# Patient Record
Sex: MALE | Race: WHITE | NOT HISPANIC OR LATINO | Employment: OTHER | URBAN - METROPOLITAN AREA
[De-identification: names, ages, dates, MRNs, and addresses within clinical notes are randomized per-mention and may not be internally consistent; named-entity substitution may affect disease eponyms.]

---

## 2019-01-03 DIAGNOSIS — Z01.812 PRE-PROCEDURAL LABORATORY EXAMINATION: ICD-10-CM

## 2019-01-03 DIAGNOSIS — Z01.810 PRE-OPERATIVE CARDIOVASCULAR EXAMINATION: ICD-10-CM

## 2019-01-03 LAB
ABO GROUP BLD: NORMAL
ANION GAP SERPL CALC-SCNC: 10 MMOL/L (ref 0–11.9)
APTT PPP: 29.4 SEC (ref 24.7–36)
BASOPHILS # BLD AUTO: 0.6 % (ref 0–1.8)
BASOPHILS # BLD: 0.04 K/UL (ref 0–0.12)
BLD GP AB SCN SERPL QL: NORMAL
BUN SERPL-MCNC: 28 MG/DL (ref 8–22)
CALCIUM SERPL-MCNC: 10.4 MG/DL (ref 8.5–10.5)
CHLORIDE SERPL-SCNC: 105 MMOL/L (ref 96–112)
CO2 SERPL-SCNC: 27 MMOL/L (ref 20–33)
CREAT SERPL-MCNC: 1.26 MG/DL (ref 0.5–1.4)
EKG IMPRESSION: NORMAL
EOSINOPHIL # BLD AUTO: 0.19 K/UL (ref 0–0.51)
EOSINOPHIL NFR BLD: 2.8 % (ref 0–6.9)
ERYTHROCYTE [DISTWIDTH] IN BLOOD BY AUTOMATED COUNT: 49.5 FL (ref 35.9–50)
EST. AVERAGE GLUCOSE BLD GHB EST-MCNC: 137 MG/DL
GLUCOSE SERPL-MCNC: 115 MG/DL (ref 65–99)
HBA1C MFR BLD: 6.4 % (ref 0–5.6)
HCT VFR BLD AUTO: 46.1 % (ref 42–52)
HGB BLD-MCNC: 15.5 G/DL (ref 14–18)
IMM GRANULOCYTES # BLD AUTO: 0.02 K/UL (ref 0–0.11)
IMM GRANULOCYTES NFR BLD AUTO: 0.3 % (ref 0–0.9)
INR PPP: 1.07 (ref 0.87–1.13)
LYMPHOCYTES # BLD AUTO: 1.95 K/UL (ref 1–4.8)
LYMPHOCYTES NFR BLD: 28.9 % (ref 22–41)
MCH RBC QN AUTO: 33 PG (ref 27–33)
MCHC RBC AUTO-ENTMCNC: 33.6 G/DL (ref 33.7–35.3)
MCV RBC AUTO: 98.1 FL (ref 81.4–97.8)
MONOCYTES # BLD AUTO: 0.63 K/UL (ref 0–0.85)
MONOCYTES NFR BLD AUTO: 9.3 % (ref 0–13.4)
NEUTROPHILS # BLD AUTO: 3.92 K/UL (ref 1.82–7.42)
NEUTROPHILS NFR BLD: 58.1 % (ref 44–72)
NRBC # BLD AUTO: 0 K/UL
NRBC BLD-RTO: 0 /100 WBC
PLATELET # BLD AUTO: 185 K/UL (ref 164–446)
PMV BLD AUTO: 11.1 FL (ref 9–12.9)
POTASSIUM SERPL-SCNC: 3.9 MMOL/L (ref 3.6–5.5)
PROTHROMBIN TIME: 14 SEC (ref 12–14.6)
RBC # BLD AUTO: 4.7 M/UL (ref 4.7–6.1)
RH BLD: NORMAL
SODIUM SERPL-SCNC: 142 MMOL/L (ref 135–145)
WBC # BLD AUTO: 6.8 K/UL (ref 4.8–10.8)

## 2019-01-03 PROCEDURE — 36415 COLL VENOUS BLD VENIPUNCTURE: CPT

## 2019-01-03 PROCEDURE — 80048 BASIC METABOLIC PNL TOTAL CA: CPT

## 2019-01-03 PROCEDURE — 93005 ELECTROCARDIOGRAM TRACING: CPT

## 2019-01-03 PROCEDURE — 93010 ELECTROCARDIOGRAM REPORT: CPT | Performed by: INTERNAL MEDICINE

## 2019-01-03 PROCEDURE — 85610 PROTHROMBIN TIME: CPT

## 2019-01-03 PROCEDURE — 86901 BLOOD TYPING SEROLOGIC RH(D): CPT

## 2019-01-03 PROCEDURE — 86900 BLOOD TYPING SEROLOGIC ABO: CPT

## 2019-01-03 PROCEDURE — 86850 RBC ANTIBODY SCREEN: CPT

## 2019-01-03 PROCEDURE — 83036 HEMOGLOBIN GLYCOSYLATED A1C: CPT

## 2019-01-03 PROCEDURE — 85025 COMPLETE CBC W/AUTO DIFF WBC: CPT

## 2019-01-03 PROCEDURE — 85730 THROMBOPLASTIN TIME PARTIAL: CPT

## 2019-01-03 RX ORDER — CHOLECALCIFEROL (VITAMIN D3) 50 MCG
2000 TABLET ORAL EVERY MORNING
Status: ON HOLD | COMMUNITY
End: 2019-02-20

## 2019-01-03 RX ORDER — CETIRIZINE HYDROCHLORIDE 10 MG/1
10 TABLET ORAL EVERY MORNING
COMMUNITY

## 2019-01-03 RX ORDER — OMEPRAZOLE 10 MG/1
10 CAPSULE, DELAYED RELEASE ORAL
COMMUNITY

## 2019-01-03 RX ORDER — ALLOPURINOL 100 MG/1
50 TABLET ORAL EVERY MORNING
COMMUNITY

## 2019-01-03 RX ORDER — HYDROCHLOROTHIAZIDE 25 MG/1
12.5 TABLET ORAL EVERY MORNING
Status: ON HOLD | COMMUNITY
End: 2019-02-21

## 2019-01-03 RX ORDER — NATEGLINIDE 120 MG/1
120 TABLET ORAL 2 TIMES DAILY
COMMUNITY

## 2019-01-03 RX ORDER — LOSARTAN POTASSIUM 50 MG/1
50 TABLET ORAL EVERY MORNING
COMMUNITY

## 2019-01-10 ENCOUNTER — APPOINTMENT (OUTPATIENT)
Dept: RADIOLOGY | Facility: MEDICAL CENTER | Age: 79
End: 2019-01-10
Attending: NEUROLOGICAL SURGERY
Payer: MEDICARE

## 2019-01-10 ENCOUNTER — HOSPITAL ENCOUNTER (OUTPATIENT)
Facility: MEDICAL CENTER | Age: 79
End: 2019-01-10
Attending: NEUROLOGICAL SURGERY | Admitting: NEUROLOGICAL SURGERY
Payer: MEDICARE

## 2019-01-10 VITALS
BODY MASS INDEX: 30.17 KG/M2 | RESPIRATION RATE: 18 BRPM | DIASTOLIC BLOOD PRESSURE: 67 MMHG | SYSTOLIC BLOOD PRESSURE: 123 MMHG | WEIGHT: 203.71 LBS | HEART RATE: 63 BPM | TEMPERATURE: 97.1 F | OXYGEN SATURATION: 94 % | HEIGHT: 69 IN

## 2019-01-10 DIAGNOSIS — Q27.39 ARTERIOVENOUS MALFORMATION, OTHER SITE: ICD-10-CM

## 2019-01-10 LAB
ABO GROUP BLD: NORMAL
GLUCOSE BLD-MCNC: 100 MG/DL (ref 65–99)
RH BLD: NORMAL

## 2019-01-10 PROCEDURE — 160002 HCHG RECOVERY MINUTES (STAT)

## 2019-01-10 PROCEDURE — 36227 PLACE CATH XTRNL CAROTID: CPT

## 2019-01-10 PROCEDURE — 36415 COLL VENOUS BLD VENIPUNCTURE: CPT

## 2019-01-10 PROCEDURE — 99153 MOD SED SAME PHYS/QHP EA: CPT

## 2019-01-10 PROCEDURE — 700105 HCHG RX REV CODE 258: Performed by: NEUROLOGICAL SURGERY

## 2019-01-10 PROCEDURE — 700111 HCHG RX REV CODE 636 W/ 250 OVERRIDE (IP)

## 2019-01-10 PROCEDURE — 82962 GLUCOSE BLOOD TEST: CPT

## 2019-01-10 RX ORDER — SODIUM CHLORIDE, SODIUM LACTATE, POTASSIUM CHLORIDE, CALCIUM CHLORIDE 600; 310; 30; 20 MG/100ML; MG/100ML; MG/100ML; MG/100ML
INJECTION, SOLUTION INTRAVENOUS ONCE
Status: COMPLETED | OUTPATIENT
Start: 2019-01-10 | End: 2019-01-10

## 2019-01-10 RX ORDER — FENOFIBRATE 145 MG/1
145 TABLET, COATED ORAL EVERY MORNING
COMMUNITY

## 2019-01-10 RX ORDER — SODIUM CHLORIDE 9 MG/ML
500 INJECTION, SOLUTION INTRAVENOUS
Status: DISPENSED | OUTPATIENT
Start: 2019-01-10 | End: 2019-01-10

## 2019-01-10 RX ORDER — SODIUM CHLORIDE 9 MG/ML
INJECTION, SOLUTION INTRAVENOUS CONTINUOUS
Status: DISCONTINUED | OUTPATIENT
Start: 2019-01-10 | End: 2019-01-10 | Stop reason: HOSPADM

## 2019-01-10 RX ORDER — FENOFIBRATE 145 MG/1
145 TABLET, COATED ORAL DAILY
Status: ON HOLD | COMMUNITY
End: 2019-01-10

## 2019-01-10 RX ORDER — MIDAZOLAM HYDROCHLORIDE 1 MG/ML
INJECTION INTRAMUSCULAR; INTRAVENOUS
Status: COMPLETED
Start: 2019-01-10 | End: 2019-01-10

## 2019-01-10 RX ORDER — MIDAZOLAM HYDROCHLORIDE 1 MG/ML
.5-2 INJECTION INTRAMUSCULAR; INTRAVENOUS PRN
Status: ACTIVE | OUTPATIENT
Start: 2019-01-10 | End: 2019-01-10

## 2019-01-10 RX ADMIN — SODIUM CHLORIDE: 9 INJECTION, SOLUTION INTRAVENOUS at 18:07

## 2019-01-10 RX ADMIN — MIDAZOLAM HYDROCHLORIDE 1 MG: 1 INJECTION INTRAMUSCULAR; INTRAVENOUS at 15:58

## 2019-01-10 RX ADMIN — MIDAZOLAM 1 MG: 1 INJECTION INTRAMUSCULAR; INTRAVENOUS at 15:40

## 2019-01-10 RX ADMIN — FENTANYL CITRATE 50 MCG: 50 INJECTION, SOLUTION INTRAMUSCULAR; INTRAVENOUS at 15:40

## 2019-01-10 RX ADMIN — SODIUM CHLORIDE, SODIUM LACTATE, POTASSIUM CHLORIDE, CALCIUM CHLORIDE: 600; 310; 30; 20 INJECTION, SOLUTION INTRAVENOUS at 14:16

## 2019-01-10 RX ADMIN — MIDAZOLAM HYDROCHLORIDE 1 MG: 1 INJECTION INTRAMUSCULAR; INTRAVENOUS at 15:40

## 2019-01-10 ASSESSMENT — PAIN SCALES - GENERAL
PAINLEVEL_OUTOF10: 0

## 2019-01-10 NOTE — PROGRESS NOTES
IR Nursing Note:    Patient consented by Dr. Vitale. Dr. Vitale performed Cerebral Angiogram.  Right femoral artery accessed. The pt tolerated the procedure well; ETCo2 baseline 30-35, with consistent waveform during the procedure.  Perclose, gauze and tegaderm applied to right groin, CDI and soft; pressure held x 3 minutes.  Pt alert and verbally appropriate post procedure, vital signs stable during procedure and transport, see flow sheet for vital signs.  Report given to MILES Corea.  RN transported pt to PACU 7B with Sao2 monitor 94%.      CorrectNet Perclose ProGlide 6F Closure System. REF [id number]. LOT [id number]

## 2019-01-11 NOTE — OR NURSING
Pt to PACU s/p cerebral angiogram in IR. Awake, oriented, ARNOLD. VSS throughout stay, NSR on monitor, Bps WNL, maintaining sats on.5L NC. Groin site WNL, dressing CDI, soft, non-tender, strong pedal pulse. Tolerating PO liquids with no c/o nausea. No c/o pain. Updated pt and family to POC, emotional support provided. Stable for transfer to phase two. Report to MILES [place].

## 2019-01-11 NOTE — OR NURSING
Assumed care of patient from PACU, see flow sheets for vital signs. Patient alert and oriented times four. Assessment completed. Surgical incision assessed, dressing clean, dry and intact. Pain is controlled and tolerable for patient. Patient updated of plan of care for discharge. Family/friend at bedside with patient.     2100: Discharge instructions reviewed and all questions answered. All needs met, patient dressed and safe to discharge home. Patient tolerating fluids with no nausea. Surgical incision CDI.    2115: Patient ready to go, last vital signs in flow sheet. PIV removed. Patient taken to car in wheelchair. Patient safe to discharge.

## 2019-01-11 NOTE — DISCHARGE INSTRUCTIONS
ACTIVITY: Rest and take it easy for the first 24 hours.  A responsible adult is recommended to remain with you during that time.  It is normal to feel sleepy.  We encourage you to not do anything that requires balance, judgment or coordination.    MILD FLU-LIKE SYMPTOMS ARE NORMAL. YOU MAY EXPERIENCE GENERALIZED MUSCLE ACHES, THROAT IRRITATION, HEADACHE AND/OR SOME NAUSEA.    FOR 24 HOURS DO NOT:  Drive, operate machinery or run household appliances.  Drink beer or alcoholic beverages.   Make important decisions or sign legal documents    SPECIAL INSTRUCTIONS:   Minimal activity at home for 2 days  No pushing, pulling or heavy lifting (10 lbs) for 2 weeks  Keep dressings on for 2 days, then remove  OK to shower after 2 days  Keep incisions clean and dry  No bath for 10 days  Regular diet    DIET: To avoid nausea, slowly advance diet as tolerated, avoiding spicy or greasy foods for the first day.  Add more substantial food to your diet according to your physician's instructions. INCREASE FLUIDS AND FIBER TO AVOID CONSTIPATION.    SURGICAL DRESSING/BATHING: See above instructions.    FOLLOW-UP APPOINTMENT:  A follow-up appointment should be arranged with your doctor in 1-2 weeks; call to schedule.    You should CALL YOUR PHYSICIAN if you develop:  Fever greater than 101 degrees F.  Pain not relieved by medication, or persistent nausea or vomiting.  Excessive bleeding (blood soaking through dressing) or unexpected drainage from the wound.  Extreme redness or swelling around the incision site, drainage of pus or foul smelling drainage.  Inability to urinate or empty your bladder within 8 hours.  Problems with breathing or chest pain.    You should call 911 if you develop problems with breathing or chest pain.  If you are unable to contact your doctor or surgical center, you should go to the nearest emergency room or urgent care center.  Physician's telephone #: Dr. Vitale [phone].    If any questions arise, call  your doctor.  If your doctor is not available, please feel free to call the Surgical Center at [phone].  The Center is open Monday through Friday from 7AM to 7PM.  You can also call the HEALTH HOTLINE open 24 hours/day, 7 days/week and speak to a nurse at [phone], or toll free at [phone].    A registered nurse may call you a few days after your surgery to see how you are doing after your procedure.    MEDICATIONS: Resume taking daily medication.  Take prescribed pain medication with food.  If no medication is prescribed, you may take non-aspirin pain medication if needed.  PAIN MEDICATION CAN BE VERY CONSTIPATING.  Take a stool softener or laxative such as senokot, pericolace, or milk of magnesia if needed.    Prescription given: none.  Last pain medication given:  None given in recovery.    If your physician has prescribed pain medication that includes Acetaminophen (Tylenol), do not take additional Acetaminophen (Tylenol) while taking the prescribed medication.    Depression / Suicide Risk    As you are discharged from this [facility] facility, it is important to learn how to keep safe from harming yourself.    Recognize the warning signs:  · Abrupt changes in personality, positive or negative- including increase in energy   · Giving away possessions  · Change in eating patterns- significant weight changes-  positive or negative  · Change in sleeping patterns- unable to sleep or sleeping all the time   · Unwillingness or inability to communicate  · Depression  · Unusual sadness, discouragement and loneliness  · Talk of wanting to die  · Neglect of personal appearance   · Rebelliousness- reckless behavior  · Withdrawal from people/activities they love  · Confusion- inability to concentrate     If you or a loved one observes any of these behaviors or has concerns about self-harm, here's what you can do:  · Talk about it- your feelings and reasons for harming yourself  · Remove any means that  you might use to hurt yourself (examples: pills, rope, extension cords, firearm)  · Get professional help from the community (Mental Health, Substance Abuse, psychological counseling)  · Do not be alone:Call your Safe Contact- someone whom you trust who will be there for you.  · Call your local CRISIS HOTLINE 551-1297 or 960-836-4204  · Call your local Children's Mobile Crisis Response Team [place] (508) 298-3648 or www.TVPage  · Call the toll free National Suicide Prevention Hotlines   · National Suicide Prevention Lifeline 980-539-AWVQ (7275)  · National Hope Line Network 800-SUICIDE (803-0727)

## 2019-01-23 NOTE — PROCEDURES
NEUROSURGERY OPERATIVE NOTE  DATE:  8:12 PM 1/10/2019    PATIENT NAME:  Howard Francois   1940 MRN [id number]      PROCEDURE:  1.  Bilateral common carotid artery angiographyi  2.  Bilaterlal internal carotida artery angiography  3.  Bilateral external carotid artery angiography  4   Bilateral vertebral artery angiography  5.  Ultrasound guided right common femoral arteriotomy    Surgeon:  James Vitale MD, PhD  Assistant:  None    Anesthesia:  Moderate Sedation  Medication:  Versed, Fentanyl   Contrast:  100 cc Visipaque 300    Diagnosis:  Right dural sinus fistula    Indication:  78 year old male with right sided pulsatile tinnitus; CTA is suggestive of dural sinus fistula involving the right transverse sinus.  Catheter angiography is planned for further evaluation.    Procedure:  The patient was identified in the holding area, and  consent was obtained.  The patinet was brought back to the angiography suite.  He was transferred to theangiography table in a supine manner and all pressure points well padded.  His bilateral groin regions were prepped with hair clipping and Chloroprep.  A 5 English sheath was paced in the right common femoral artery under ultrasound guidance using standard Seldinger technique.    A 4 English Angled glide catheter was positioned in the right common carotid artery using flouroscopic guidance.  Oblique and lateral views of the carotid arteries were obtained.  The catheter was positioned within the right internal carotid artery; standard AP, lateral, and magnified oblique views of the intracranial circulation were obtained.  The catheter was positioned in the right external carotid artery; AP and lateral views centered over the cranium were obtained.     The catheter was withdrawn into the aortic arch and positioned in the left common carotid artery using flouroscopic guidance.  Oblique and lateral views of the carotid arteries were obtained.  The catheter was positioned within the  left internal carotid artery; standard AP, lateral, and magnified oblique views of the intracranial circulation were obtained.  The catheter was positioned in the left external carotid artery; AP and lateral views centered over the cranium were obtained.     The catheter was withdrawn into the aortic arch and positioned in the left vertebral artery; standard Chucky and lateral views of the posterior circulation were obtained.   The images were reviewed and the catheter removed.  Hemostasis was obtained with a 6 [place] Perclose device.    Findings:  The right common carotid artery demonstrates anterograde opacification of the cervical carotid arteries.  Robust filling of a right sided arterial dural sinus fistula draining into the right transverse sinus is supplied by an enlarged occipital artery.  Venous drainage is anterograde.    The right internal carotid artery injection demonstrates anterograde opacification of the anterior and middle cerebral arteries.  No opacification of the posterior communicating artery occurs; no opacification of the anterior communicating artery occurs.  Early opacification of the right transverse sinus occurs via an enlarged dorsal tentorial artery.  Limited opacification of the right transverse sinus occurs due to competing flow from the fistula.    The right external carotid artery injection demonstrates anterograde opacification of the external carotid artery branches occurs.  Brisk opacification of the right transverse sinus occurs via an enlarged occipital artery.  Venous drainage refluxes into the petrosal sinus to the cavernous sinus.      The right vertebral artery injection demonstrates anterograde opacification of the posterior circulation.  Early opacification of the right transverse sinus occurs via an enlarged muscular artery originating off the distal vertebral artery.  The capillary and venous phases are otherwise unremarkable.    The left common carotid artery injection  demonstrates anterograde opacification of the common carotid artery branches.      The left internal carotid artery injection demonstrates anterograde opacification of the anterior and middle cerebral arteries.  No opacification of the posterior communicating artery occurs. The anterior communicating artery is well visualized on the transorbital views.  The capillary and venous phases were unremarkable except for limited opacification of the right transverse sinus.    The left external carotid artery injection demonstrates anterograde opacification of the external carotid artery branches.  Limited opacification of the right transverse sinus occurs via transosseous anastomosis of external carotid artery branches crossing from the left scalp branches.      The left vertebral artery injection demonstrates anterograde opacification of the posterior circulation.  The capillary and venous phases are unremarkable.  The vertebral arteries are co-dominant.    Summary:    1.  Right dural arterial venous fistula supplied primarilly by right occipital artery, also by right dorsal tentorial branch from right ICA, muscular branch from right vertebral artery, and left superficial artery crossing over midline.  Venous drainage is to right transverse sinus into petrosal sinus/cavernous sinus.  2.  Patent anterior communicating artery, no opacification of bilateral posterior communicating arteries.  3.  Codominant vertebral arteries    COMPLICATIONS:  None apparent at end of procedure.

## 2019-02-08 DIAGNOSIS — Z01.812 PRE-OPERATIVE LABORATORY EXAMINATION: ICD-10-CM

## 2019-02-08 LAB
ANION GAP SERPL CALC-SCNC: 9 MMOL/L (ref 0–11.9)
BASOPHILS # BLD AUTO: 0.8 % (ref 0–1.8)
BASOPHILS # BLD: 0.04 K/UL (ref 0–0.12)
BUN SERPL-MCNC: 23 MG/DL (ref 8–22)
CALCIUM SERPL-MCNC: 9.5 MG/DL (ref 8.5–10.5)
CHLORIDE SERPL-SCNC: 105 MMOL/L (ref 96–112)
CO2 SERPL-SCNC: 26 MMOL/L (ref 20–33)
CREAT SERPL-MCNC: 1.27 MG/DL (ref 0.5–1.4)
EOSINOPHIL # BLD AUTO: 0.18 K/UL (ref 0–0.51)
EOSINOPHIL NFR BLD: 3.6 % (ref 0–6.9)
ERYTHROCYTE [DISTWIDTH] IN BLOOD BY AUTOMATED COUNT: 49.1 FL (ref 35.9–50)
GLUCOSE SERPL-MCNC: 102 MG/DL (ref 65–99)
HCT VFR BLD AUTO: 45 % (ref 42–52)
HGB BLD-MCNC: 15.3 G/DL (ref 14–18)
IMM GRANULOCYTES # BLD AUTO: 0.02 K/UL (ref 0–0.11)
IMM GRANULOCYTES NFR BLD AUTO: 0.4 % (ref 0–0.9)
LYMPHOCYTES # BLD AUTO: 1.62 K/UL (ref 1–4.8)
LYMPHOCYTES NFR BLD: 32.7 % (ref 22–41)
MCH RBC QN AUTO: 33.2 PG (ref 27–33)
MCHC RBC AUTO-ENTMCNC: 34 G/DL (ref 33.7–35.3)
MCV RBC AUTO: 97.6 FL (ref 81.4–97.8)
MONOCYTES # BLD AUTO: 0.47 K/UL (ref 0–0.85)
MONOCYTES NFR BLD AUTO: 9.5 % (ref 0–13.4)
NEUTROPHILS # BLD AUTO: 2.62 K/UL (ref 1.82–7.42)
NEUTROPHILS NFR BLD: 53 % (ref 44–72)
NRBC # BLD AUTO: 0 K/UL
NRBC BLD-RTO: 0 /100 WBC
PLATELET # BLD AUTO: 170 K/UL (ref 164–446)
PMV BLD AUTO: 11 FL (ref 9–12.9)
POTASSIUM SERPL-SCNC: 4.1 MMOL/L (ref 3.6–5.5)
RBC # BLD AUTO: 4.61 M/UL (ref 4.7–6.1)
SODIUM SERPL-SCNC: 140 MMOL/L (ref 135–145)
WBC # BLD AUTO: 5 K/UL (ref 4.8–10.8)

## 2019-02-08 PROCEDURE — 36415 COLL VENOUS BLD VENIPUNCTURE: CPT

## 2019-02-08 PROCEDURE — 80048 BASIC METABOLIC PNL TOTAL CA: CPT

## 2019-02-08 PROCEDURE — 85025 COMPLETE CBC W/AUTO DIFF WBC: CPT

## 2019-02-20 ENCOUNTER — HOSPITAL ENCOUNTER (INPATIENT)
Facility: MEDICAL CENTER | Age: 79
LOS: 1 days | DRG: 026 | End: 2019-02-21
Attending: NEUROLOGICAL SURGERY | Admitting: NEUROLOGICAL SURGERY
Payer: MEDICARE

## 2019-02-20 ENCOUNTER — APPOINTMENT (OUTPATIENT)
Dept: RADIOLOGY | Facility: MEDICAL CENTER | Age: 79
DRG: 026 | End: 2019-02-20
Attending: NEUROLOGICAL SURGERY
Payer: MEDICARE

## 2019-02-20 DIAGNOSIS — Q27.39 ARTERIOVENOUS MALFORMATION, OTHER SITE: ICD-10-CM

## 2019-02-20 DIAGNOSIS — Q28.2 AVM (ARTERIOVENOUS MALFORMATION) BRAIN: ICD-10-CM

## 2019-02-20 DIAGNOSIS — G89.18 POST-OPERATIVE PAIN: ICD-10-CM

## 2019-02-20 PROBLEM — H91.93 BILATERAL HEARING LOSS: Status: ACTIVE | Noted: 2019-02-20

## 2019-02-20 PROBLEM — I48.91 NEW ONSET ATRIAL FIBRILLATION (HCC): Status: ACTIVE | Noted: 2019-02-20

## 2019-02-20 PROBLEM — M10.9 GOUT: Status: ACTIVE | Noted: 2019-02-20

## 2019-02-20 PROBLEM — E11.9 DIABETES MELLITUS TYPE 2 IN NONOBESE (HCC): Status: ACTIVE | Noted: 2019-02-20

## 2019-02-20 LAB — GLUCOSE BLD-MCNC: 105 MG/DL (ref 65–99)

## 2019-02-20 PROCEDURE — B3131ZZ FLUOROSCOPY OF RIGHT COMMON CAROTID ARTERY USING LOW OSMOLAR CONTRAST: ICD-10-PCS | Performed by: NEUROLOGICAL SURGERY

## 2019-02-20 PROCEDURE — 700101 HCHG RX REV CODE 250

## 2019-02-20 PROCEDURE — 99291 CRITICAL CARE FIRST HOUR: CPT | Performed by: INTERNAL MEDICINE

## 2019-02-20 PROCEDURE — 82962 GLUCOSE BLOOD TEST: CPT

## 2019-02-20 PROCEDURE — 700111 HCHG RX REV CODE 636 W/ 250 OVERRIDE (IP)

## 2019-02-20 PROCEDURE — 770022 HCHG ROOM/CARE - ICU (200)

## 2019-02-20 PROCEDURE — A9270 NON-COVERED ITEM OR SERVICE: HCPCS | Performed by: NEUROLOGICAL SURGERY

## 2019-02-20 PROCEDURE — B3161ZZ FLUOROSCOPY OF RIGHT INTERNAL CAROTID ARTERY USING LOW OSMOLAR CONTRAST: ICD-10-PCS | Performed by: NEUROLOGICAL SURGERY

## 2019-02-20 PROCEDURE — 03LG3DZ OCCLUSION OF INTRACRANIAL ARTERY WITH INTRALUMINAL DEVICE, PERCUTANEOUS APPROACH: ICD-10-PCS | Performed by: NEUROLOGICAL SURGERY

## 2019-02-20 PROCEDURE — 160002 HCHG RECOVERY MINUTES (STAT)

## 2019-02-20 PROCEDURE — 700111 HCHG RX REV CODE 636 W/ 250 OVERRIDE (IP): Performed by: NEUROLOGICAL SURGERY

## 2019-02-20 PROCEDURE — 700102 HCHG RX REV CODE 250 W/ 637 OVERRIDE(OP): Performed by: NEUROLOGICAL SURGERY

## 2019-02-20 PROCEDURE — B3191ZZ FLUOROSCOPY OF RIGHT EXTERNAL CAROTID ARTERY USING LOW OSMOLAR CONTRAST: ICD-10-PCS | Performed by: NEUROLOGICAL SURGERY

## 2019-02-20 PROCEDURE — 700111 HCHG RX REV CODE 636 W/ 250 OVERRIDE (IP): Performed by: ANESTHESIOLOGY

## 2019-02-20 PROCEDURE — 700101 HCHG RX REV CODE 250: Performed by: NEUROLOGICAL SURGERY

## 2019-02-20 PROCEDURE — B31D1ZZ FLUOROSCOPY OF RIGHT VERTEBRAL ARTERY USING LOW OSMOLAR CONTRAST: ICD-10-PCS | Performed by: NEUROLOGICAL SURGERY

## 2019-02-20 PROCEDURE — 93005 ELECTROCARDIOGRAM TRACING: CPT | Performed by: HOSPITALIST

## 2019-02-20 PROCEDURE — 99222 1ST HOSP IP/OBS MODERATE 55: CPT | Performed by: HOSPITALIST

## 2019-02-20 PROCEDURE — 4410237 IR-EMBOLIZE-NEURO-INTRACRANIAL

## 2019-02-20 PROCEDURE — 61624 TCAT PERM OCCLS/EMBOLJ CNS: CPT

## 2019-02-20 PROCEDURE — 93010 ELECTROCARDIOGRAM REPORT: CPT | Performed by: INTERNAL MEDICINE

## 2019-02-20 RX ORDER — POLYETHYLENE GLYCOL 3350 17 G/17G
1 POWDER, FOR SOLUTION ORAL 2 TIMES DAILY PRN
Status: DISCONTINUED | OUTPATIENT
Start: 2019-02-20 | End: 2019-02-21 | Stop reason: HOSPADM

## 2019-02-20 RX ORDER — IPRATROPIUM BROMIDE AND ALBUTEROL SULFATE 2.5; .5 MG/3ML; MG/3ML
3 SOLUTION RESPIRATORY (INHALATION)
Status: DISCONTINUED | OUTPATIENT
Start: 2019-02-20 | End: 2019-02-20 | Stop reason: HOSPADM

## 2019-02-20 RX ORDER — DEXTROSE MONOHYDRATE, SODIUM CHLORIDE, AND POTASSIUM CHLORIDE 50; 1.49; 9 G/1000ML; G/1000ML; G/1000ML
INJECTION, SOLUTION INTRAVENOUS CONTINUOUS
Status: DISCONTINUED | OUTPATIENT
Start: 2019-02-20 | End: 2019-02-21

## 2019-02-20 RX ORDER — ONDANSETRON 4 MG/1
4 TABLET, ORALLY DISINTEGRATING ORAL EVERY 4 HOURS PRN
Status: DISCONTINUED | OUTPATIENT
Start: 2019-02-20 | End: 2019-02-20

## 2019-02-20 RX ORDER — BISACODYL 10 MG
10 SUPPOSITORY, RECTAL RECTAL
Status: DISCONTINUED | OUTPATIENT
Start: 2019-02-20 | End: 2019-02-21 | Stop reason: HOSPADM

## 2019-02-20 RX ORDER — DEXAMETHASONE SODIUM PHOSPHATE 4 MG/ML
4 INJECTION, SOLUTION INTRA-ARTICULAR; INTRALESIONAL; INTRAMUSCULAR; INTRAVENOUS; SOFT TISSUE
Status: COMPLETED | OUTPATIENT
Start: 2019-02-20 | End: 2019-02-20

## 2019-02-20 RX ORDER — HALOPERIDOL 5 MG/ML
1 INJECTION INTRAMUSCULAR
Status: DISCONTINUED | OUTPATIENT
Start: 2019-02-20 | End: 2019-02-20 | Stop reason: HOSPADM

## 2019-02-20 RX ORDER — MEPERIDINE HYDROCHLORIDE 25 MG/ML
12.5 INJECTION INTRAMUSCULAR; INTRAVENOUS; SUBCUTANEOUS
Status: DISCONTINUED | OUTPATIENT
Start: 2019-02-20 | End: 2019-02-20 | Stop reason: HOSPADM

## 2019-02-20 RX ORDER — BISACODYL 10 MG
10 SUPPOSITORY, RECTAL RECTAL
Status: DISCONTINUED | OUTPATIENT
Start: 2019-02-20 | End: 2019-02-20

## 2019-02-20 RX ORDER — SODIUM CHLORIDE, SODIUM LACTATE, POTASSIUM CHLORIDE, CALCIUM CHLORIDE 600; 310; 30; 20 MG/100ML; MG/100ML; MG/100ML; MG/100ML
INJECTION, SOLUTION INTRAVENOUS CONTINUOUS
Status: DISCONTINUED | OUTPATIENT
Start: 2019-02-20 | End: 2019-02-20 | Stop reason: HOSPADM

## 2019-02-20 RX ORDER — ONDANSETRON 2 MG/ML
4 INJECTION INTRAMUSCULAR; INTRAVENOUS
Status: COMPLETED | OUTPATIENT
Start: 2019-02-20 | End: 2019-02-20

## 2019-02-20 RX ORDER — FENOFIBRATE 134 MG/1
134 CAPSULE ORAL EVERY MORNING
Status: DISCONTINUED | OUTPATIENT
Start: 2019-02-21 | End: 2019-02-21 | Stop reason: HOSPADM

## 2019-02-20 RX ORDER — OXYCODONE HYDROCHLORIDE 5 MG/1
5 TABLET ORAL
Status: DISCONTINUED | OUTPATIENT
Start: 2019-02-20 | End: 2019-02-21 | Stop reason: HOSPADM

## 2019-02-20 RX ORDER — AMOXICILLIN 250 MG
1 CAPSULE ORAL
Status: DISCONTINUED | OUTPATIENT
Start: 2019-02-20 | End: 2019-02-21 | Stop reason: HOSPADM

## 2019-02-20 RX ORDER — CETIRIZINE HYDROCHLORIDE 10 MG/1
10 TABLET ORAL EVERY MORNING
Status: DISCONTINUED | OUTPATIENT
Start: 2019-02-21 | End: 2019-02-21 | Stop reason: HOSPADM

## 2019-02-20 RX ORDER — HEPARIN SODIUM,PORCINE 1000/ML
VIAL (ML) INJECTION
Status: COMPLETED
Start: 2019-02-20 | End: 2019-02-20

## 2019-02-20 RX ORDER — NATEGLINIDE 120 MG/1
120 TABLET ORAL 2 TIMES DAILY
Status: DISCONTINUED | OUTPATIENT
Start: 2019-02-21 | End: 2019-02-21 | Stop reason: HOSPADM

## 2019-02-20 RX ORDER — ACETAMINOPHEN 500 MG
1000 TABLET ORAL ONCE
Status: DISCONTINUED | OUTPATIENT
Start: 2019-02-20 | End: 2019-02-20

## 2019-02-20 RX ORDER — ACETAMINOPHEN 325 MG/1
650 TABLET ORAL EVERY 6 HOURS PRN
Status: DISCONTINUED | OUTPATIENT
Start: 2019-02-20 | End: 2019-02-21 | Stop reason: HOSPADM

## 2019-02-20 RX ORDER — LOSARTAN POTASSIUM 50 MG/1
50 TABLET ORAL EVERY MORNING
Status: DISCONTINUED | OUTPATIENT
Start: 2019-02-21 | End: 2019-02-20

## 2019-02-20 RX ORDER — DIPHENHYDRAMINE HYDROCHLORIDE 50 MG/ML
12.5 INJECTION INTRAMUSCULAR; INTRAVENOUS
Status: COMPLETED | OUTPATIENT
Start: 2019-02-20 | End: 2019-02-20

## 2019-02-20 RX ORDER — SODIUM CHLORIDE, SODIUM LACTATE, POTASSIUM CHLORIDE, CALCIUM CHLORIDE 600; 310; 30; 20 MG/100ML; MG/100ML; MG/100ML; MG/100ML
INJECTION, SOLUTION INTRAVENOUS ONCE
Status: COMPLETED | OUTPATIENT
Start: 2019-02-20 | End: 2019-02-20

## 2019-02-20 RX ORDER — MORPHINE SULFATE 4 MG/ML
2 INJECTION, SOLUTION INTRAMUSCULAR; INTRAVENOUS
Status: DISCONTINUED | OUTPATIENT
Start: 2019-02-20 | End: 2019-02-21 | Stop reason: HOSPADM

## 2019-02-20 RX ORDER — OMEPRAZOLE 20 MG/1
20 CAPSULE, DELAYED RELEASE ORAL
Status: DISCONTINUED | OUTPATIENT
Start: 2019-02-20 | End: 2019-02-21 | Stop reason: HOSPADM

## 2019-02-20 RX ORDER — IBUPROFEN 200 MG
200 TABLET ORAL EVERY 6 HOURS PRN
Status: ON HOLD | COMMUNITY
End: 2019-02-21

## 2019-02-20 RX ORDER — SCOLOPAMINE TRANSDERMAL SYSTEM 1 MG/1
1 PATCH, EXTENDED RELEASE TRANSDERMAL
Status: DISCONTINUED | OUTPATIENT
Start: 2019-02-20 | End: 2019-02-21 | Stop reason: HOSPADM

## 2019-02-20 RX ORDER — OXYCODONE HYDROCHLORIDE 5 MG/1
2.5 TABLET ORAL
Status: DISCONTINUED | OUTPATIENT
Start: 2019-02-20 | End: 2019-02-21 | Stop reason: HOSPADM

## 2019-02-20 RX ORDER — LABETALOL HYDROCHLORIDE 5 MG/ML
10 INJECTION, SOLUTION INTRAVENOUS
Status: DISCONTINUED | OUTPATIENT
Start: 2019-02-20 | End: 2019-02-21 | Stop reason: HOSPADM

## 2019-02-20 RX ORDER — AMOXICILLIN 250 MG
2 CAPSULE ORAL 2 TIMES DAILY
Status: DISCONTINUED | OUTPATIENT
Start: 2019-02-20 | End: 2019-02-20

## 2019-02-20 RX ORDER — ALLOPURINOL 100 MG/1
50 TABLET ORAL EVERY MORNING
Status: DISCONTINUED | OUTPATIENT
Start: 2019-02-21 | End: 2019-02-21 | Stop reason: HOSPADM

## 2019-02-20 RX ORDER — HYDROCHLOROTHIAZIDE 12.5 MG/1
12.5 TABLET ORAL EVERY MORNING
Status: DISCONTINUED | OUTPATIENT
Start: 2019-02-21 | End: 2019-02-21

## 2019-02-20 RX ORDER — ONDANSETRON 2 MG/ML
4 INJECTION INTRAMUSCULAR; INTRAVENOUS EVERY 4 HOURS PRN
Status: DISCONTINUED | OUTPATIENT
Start: 2019-02-20 | End: 2019-02-21 | Stop reason: HOSPADM

## 2019-02-20 RX ORDER — DEXTROSE MONOHYDRATE 25 G/50ML
25 INJECTION, SOLUTION INTRAVENOUS
Status: DISCONTINUED | OUTPATIENT
Start: 2019-02-20 | End: 2019-02-21 | Stop reason: HOSPADM

## 2019-02-20 RX ORDER — ENEMA 19; 7 G/133ML; G/133ML
1 ENEMA RECTAL
Status: DISCONTINUED | OUTPATIENT
Start: 2019-02-20 | End: 2019-02-21 | Stop reason: HOSPADM

## 2019-02-20 RX ORDER — DIPHENHYDRAMINE HYDROCHLORIDE 50 MG/ML
25 INJECTION INTRAMUSCULAR; INTRAVENOUS EVERY 6 HOURS PRN
Status: DISCONTINUED | OUTPATIENT
Start: 2019-02-20 | End: 2019-02-21 | Stop reason: HOSPADM

## 2019-02-20 RX ORDER — HYDRALAZINE HYDROCHLORIDE 20 MG/ML
10 INJECTION INTRAMUSCULAR; INTRAVENOUS
Status: DISCONTINUED | OUTPATIENT
Start: 2019-02-20 | End: 2019-02-21 | Stop reason: HOSPADM

## 2019-02-20 RX ORDER — ONDANSETRON 2 MG/ML
4 INJECTION INTRAMUSCULAR; INTRAVENOUS EVERY 4 HOURS PRN
Status: DISCONTINUED | OUTPATIENT
Start: 2019-02-20 | End: 2019-02-20

## 2019-02-20 RX ORDER — DOCUSATE SODIUM 100 MG/1
100 CAPSULE, LIQUID FILLED ORAL 2 TIMES DAILY
Status: DISCONTINUED | OUTPATIENT
Start: 2019-02-20 | End: 2019-02-21 | Stop reason: HOSPADM

## 2019-02-20 RX ORDER — AMOXICILLIN 250 MG
1 CAPSULE ORAL NIGHTLY
Status: DISCONTINUED | OUTPATIENT
Start: 2019-02-20 | End: 2019-02-21 | Stop reason: HOSPADM

## 2019-02-20 RX ORDER — CLONIDINE HYDROCHLORIDE 0.1 MG/1
0.1 TABLET ORAL EVERY 4 HOURS PRN
Status: DISCONTINUED | OUTPATIENT
Start: 2019-02-20 | End: 2019-02-21 | Stop reason: HOSPADM

## 2019-02-20 RX ORDER — ACETAMINOPHEN 325 MG/1
650 TABLET ORAL EVERY 6 HOURS
Status: DISCONTINUED | OUTPATIENT
Start: 2019-02-21 | End: 2019-02-21 | Stop reason: HOSPADM

## 2019-02-20 RX ORDER — POLYETHYLENE GLYCOL 3350 17 G/17G
1 POWDER, FOR SOLUTION ORAL
Status: DISCONTINUED | OUTPATIENT
Start: 2019-02-20 | End: 2019-02-20

## 2019-02-20 RX ADMIN — FENTANYL CITRATE 50 MCG: 50 INJECTION, SOLUTION INTRAMUSCULAR; INTRAVENOUS at 21:18

## 2019-02-20 RX ADMIN — SODIUM CHLORIDE, SODIUM LACTATE, POTASSIUM CHLORIDE, CALCIUM CHLORIDE: 600; 310; 30; 20 INJECTION, SOLUTION INTRAVENOUS at 13:40

## 2019-02-20 RX ADMIN — LIDOCAINE HYDROCHLORIDE 0.5 ML: 10 INJECTION, SOLUTION EPIDURAL; INFILTRATION; INTRACAUDAL; PERINEURAL at 13:40

## 2019-02-20 RX ADMIN — DIPHENHYDRAMINE HYDROCHLORIDE 12.5 MG: 50 INJECTION INTRAMUSCULAR; INTRAVENOUS at 21:35

## 2019-02-20 RX ADMIN — POTASSIUM CHLORIDE, DEXTROSE MONOHYDRATE AND SODIUM CHLORIDE: 150; 5; 900 INJECTION, SOLUTION INTRAVENOUS at 23:34

## 2019-02-20 RX ADMIN — ONDANSETRON 4 MG: 2 INJECTION INTRAMUSCULAR; INTRAVENOUS at 21:25

## 2019-02-20 RX ADMIN — MORPHINE SULFATE 2 MG: 4 INJECTION INTRAVENOUS at 23:11

## 2019-02-20 RX ADMIN — DIPHENHYDRAMINE HYDROCHLORIDE 12.5 MG: 50 INJECTION INTRAMUSCULAR; INTRAVENOUS at 21:58

## 2019-02-20 RX ADMIN — HEPARIN SODIUM 3000 UNITS: 1000 INJECTION, SOLUTION INTRAVENOUS; SUBCUTANEOUS at 18:19

## 2019-02-20 RX ADMIN — ACETAMINOPHEN 650 MG: 325 TABLET, FILM COATED ORAL at 23:40

## 2019-02-20 RX ADMIN — DEXAMETHASONE SODIUM PHOSPHATE 4 MG: 4 INJECTION, SOLUTION INTRA-ARTICULAR; INTRALESIONAL; INTRAMUSCULAR; INTRAVENOUS; SOFT TISSUE at 23:24

## 2019-02-20 RX ADMIN — FENTANYL CITRATE: 50 INJECTION, SOLUTION INTRAMUSCULAR; INTRAVENOUS at 16:30

## 2019-02-20 ASSESSMENT — ENCOUNTER SYMPTOMS
CLAUDICATION: 0
SPEECH CHANGE: 0
NAUSEA: 0
WEAKNESS: 0
DEPRESSION: 0
CHILLS: 0
VOMITING: 0
FOCAL WEAKNESS: 0
LOSS OF CONSCIOUSNESS: 0
DIZZINESS: 0
COUGH: 0
CONSTIPATION: 0
HEADACHES: 1
FEVER: 0
HEARTBURN: 0
SORE THROAT: 0
WHEEZING: 0
HEADACHES: 0
EYE PAIN: 0
HEMOPTYSIS: 0
SPUTUM PRODUCTION: 0
SHORTNESS OF BREATH: 0
ABDOMINAL PAIN: 0
NECK PAIN: 0
BLURRED VISION: 0
EYE DISCHARGE: 0
PND: 0
PALPITATIONS: 0
BRUISES/BLEEDS EASILY: 0
TREMORS: 0
DIARRHEA: 0
MYALGIAS: 0
SENSORY CHANGE: 0
DOUBLE VISION: 0
BACK PAIN: 0
DIAPHORESIS: 0

## 2019-02-20 ASSESSMENT — LIFESTYLE VARIABLES
SUBSTANCE_ABUSE: 0
EVER_SMOKED: YES

## 2019-02-20 ASSESSMENT — COPD QUESTIONNAIRES
DO YOU EVER COUGH UP ANY MUCUS OR PHLEGM?: NO/ONLY WITH OCCASIONAL COLDS OR INFECTIONS
COPD SCREENING SCORE: 4
HAVE YOU SMOKED AT LEAST 100 CIGARETTES IN YOUR ENTIRE LIFE: YES
DURING THE PAST 4 WEEKS HOW MUCH DID YOU FEEL SHORT OF BREATH: NONE/LITTLE OF THE TIME

## 2019-02-20 NOTE — PROGRESS NOTES
Med rec updated and complete  Allergies reviewed  Interviewed pt with wife at bedside with permission from pt.  Pt reports no antibiotics in the last 30 days.

## 2019-02-21 ENCOUNTER — PATIENT OUTREACH (OUTPATIENT)
Dept: HEALTH INFORMATION MANAGEMENT | Facility: OTHER | Age: 79
End: 2019-02-21

## 2019-02-21 ENCOUNTER — APPOINTMENT (OUTPATIENT)
Dept: CARDIOLOGY | Facility: MEDICAL CENTER | Age: 79
DRG: 026 | End: 2019-02-21
Attending: HOSPITALIST
Payer: MEDICARE

## 2019-02-21 VITALS
TEMPERATURE: 97.3 F | RESPIRATION RATE: 18 BRPM | BODY MASS INDEX: 30.37 KG/M2 | HEIGHT: 69 IN | SYSTOLIC BLOOD PRESSURE: 138 MMHG | OXYGEN SATURATION: 94 % | WEIGHT: 205.03 LBS | DIASTOLIC BLOOD PRESSURE: 81 MMHG | HEART RATE: 66 BPM

## 2019-02-21 PROBLEM — I10 ESSENTIAL HYPERTENSION: Status: ACTIVE | Noted: 2019-02-21

## 2019-02-21 LAB
ALBUMIN SERPL BCP-MCNC: 3.7 G/DL (ref 3.2–4.9)
ALBUMIN/GLOB SERPL: 1.3 G/DL
ALP SERPL-CCNC: 42 U/L (ref 30–99)
ALT SERPL-CCNC: 19 U/L (ref 2–50)
ANION GAP SERPL CALC-SCNC: 12 MMOL/L (ref 0–11.9)
AST SERPL-CCNC: 31 U/L (ref 12–45)
BASOPHILS # BLD AUTO: 0.1 % (ref 0–1.8)
BASOPHILS # BLD: 0.01 K/UL (ref 0–0.12)
BILIRUB SERPL-MCNC: 0.7 MG/DL (ref 0.1–1.5)
BUN SERPL-MCNC: 21 MG/DL (ref 8–22)
CALCIUM SERPL-MCNC: 8.6 MG/DL (ref 8.5–10.5)
CHLORIDE SERPL-SCNC: 107 MMOL/L (ref 96–112)
CO2 SERPL-SCNC: 19 MMOL/L (ref 20–33)
CREAT SERPL-MCNC: 1.04 MG/DL (ref 0.5–1.4)
EKG IMPRESSION: NORMAL
EOSINOPHIL # BLD AUTO: 0 K/UL (ref 0–0.51)
EOSINOPHIL NFR BLD: 0 % (ref 0–6.9)
ERYTHROCYTE [DISTWIDTH] IN BLOOD BY AUTOMATED COUNT: 49.6 FL (ref 35.9–50)
GLOBULIN SER CALC-MCNC: 2.9 G/DL (ref 1.9–3.5)
GLUCOSE BLD-MCNC: 186 MG/DL (ref 65–99)
GLUCOSE BLD-MCNC: 234 MG/DL (ref 65–99)
GLUCOSE SERPL-MCNC: 232 MG/DL (ref 65–99)
HCT VFR BLD AUTO: 44.2 % (ref 42–52)
HGB BLD-MCNC: 14.7 G/DL (ref 14–18)
IMM GRANULOCYTES # BLD AUTO: 0.02 K/UL (ref 0–0.11)
IMM GRANULOCYTES NFR BLD AUTO: 0.3 % (ref 0–0.9)
LV EJECT FRACT  99904: 60
LV EJECT FRACT MOD 2C 99903: 58.89
LV EJECT FRACT MOD 4C 99902: 69.42
LV EJECT FRACT MOD BP 99901: 64.7
LYMPHOCYTES # BLD AUTO: 0.55 K/UL (ref 1–4.8)
LYMPHOCYTES NFR BLD: 7.5 % (ref 22–41)
MAGNESIUM SERPL-MCNC: 1.7 MG/DL (ref 1.5–2.5)
MCH RBC QN AUTO: 32.7 PG (ref 27–33)
MCHC RBC AUTO-ENTMCNC: 33.3 G/DL (ref 33.7–35.3)
MCV RBC AUTO: 98.4 FL (ref 81.4–97.8)
MONOCYTES # BLD AUTO: 0.05 K/UL (ref 0–0.85)
MONOCYTES NFR BLD AUTO: 0.7 % (ref 0–13.4)
NEUTROPHILS # BLD AUTO: 6.67 K/UL (ref 1.82–7.42)
NEUTROPHILS NFR BLD: 91.4 % (ref 44–72)
NRBC # BLD AUTO: 0 K/UL
NRBC BLD-RTO: 0 /100 WBC
PLATELET # BLD AUTO: 157 K/UL (ref 164–446)
PMV BLD AUTO: 10.8 FL (ref 9–12.9)
POTASSIUM SERPL-SCNC: 3.8 MMOL/L (ref 3.6–5.5)
PROT SERPL-MCNC: 6.6 G/DL (ref 6–8.2)
RBC # BLD AUTO: 4.49 M/UL (ref 4.7–6.1)
SODIUM SERPL-SCNC: 138 MMOL/L (ref 135–145)
TROPONIN I SERPL-MCNC: <0.01 NG/ML (ref 0–0.04)
TSH SERPL DL<=0.005 MIU/L-ACNC: 0.48 UIU/ML (ref 0.38–5.33)
WBC # BLD AUTO: 7.3 K/UL (ref 4.8–10.8)

## 2019-02-21 PROCEDURE — 700102 HCHG RX REV CODE 250 W/ 637 OVERRIDE(OP): Performed by: HOSPITALIST

## 2019-02-21 PROCEDURE — 700102 HCHG RX REV CODE 250 W/ 637 OVERRIDE(OP): Performed by: NEUROLOGICAL SURGERY

## 2019-02-21 PROCEDURE — 85025 COMPLETE CBC W/AUTO DIFF WBC: CPT

## 2019-02-21 PROCEDURE — 84443 ASSAY THYROID STIM HORMONE: CPT

## 2019-02-21 PROCEDURE — 82962 GLUCOSE BLOOD TEST: CPT | Mod: 91

## 2019-02-21 PROCEDURE — 700101 HCHG RX REV CODE 250: Performed by: NEUROLOGICAL SURGERY

## 2019-02-21 PROCEDURE — A9270 NON-COVERED ITEM OR SERVICE: HCPCS | Performed by: NEUROLOGICAL SURGERY

## 2019-02-21 PROCEDURE — A9270 NON-COVERED ITEM OR SERVICE: HCPCS | Performed by: HOSPITALIST

## 2019-02-21 PROCEDURE — 99232 SBSQ HOSP IP/OBS MODERATE 35: CPT | Performed by: HOSPITALIST

## 2019-02-21 PROCEDURE — 80053 COMPREHEN METABOLIC PANEL: CPT

## 2019-02-21 PROCEDURE — 3E0234Z INTRODUCTION OF SERUM, TOXOID AND VACCINE INTO MUSCLE, PERCUTANEOUS APPROACH: ICD-10-PCS | Performed by: HOSPITALIST

## 2019-02-21 PROCEDURE — 93306 TTE W/DOPPLER COMPLETE: CPT

## 2019-02-21 PROCEDURE — 93306 TTE W/DOPPLER COMPLETE: CPT | Mod: 26 | Performed by: INTERNAL MEDICINE

## 2019-02-21 PROCEDURE — 99233 SBSQ HOSP IP/OBS HIGH 50: CPT | Performed by: INTERNAL MEDICINE

## 2019-02-21 PROCEDURE — 90732 PPSV23 VACC 2 YRS+ SUBQ/IM: CPT | Performed by: HOSPITALIST

## 2019-02-21 PROCEDURE — 84484 ASSAY OF TROPONIN QUANT: CPT

## 2019-02-21 PROCEDURE — 700111 HCHG RX REV CODE 636 W/ 250 OVERRIDE (IP): Performed by: NEUROLOGICAL SURGERY

## 2019-02-21 PROCEDURE — 83735 ASSAY OF MAGNESIUM: CPT

## 2019-02-21 PROCEDURE — 700111 HCHG RX REV CODE 636 W/ 250 OVERRIDE (IP): Performed by: HOSPITALIST

## 2019-02-21 PROCEDURE — 90471 IMMUNIZATION ADMIN: CPT

## 2019-02-21 RX ORDER — TRAMADOL HYDROCHLORIDE 50 MG/1
50 TABLET ORAL EVERY 6 HOURS PRN
Qty: 28 TAB | Refills: 0 | Status: SHIPPED | OUTPATIENT
Start: 2019-02-21 | End: 2019-02-28

## 2019-02-21 RX ORDER — ACETAMINOPHEN 500 MG
1000 TABLET ORAL EVERY 6 HOURS PRN
Qty: 30 TAB | Refills: 0 | COMMUNITY
Start: 2019-02-21

## 2019-02-21 RX ORDER — BUTALBITAL, ACETAMINOPHEN AND CAFFEINE 50; 325; 40 MG/1; MG/1; MG/1
1 TABLET ORAL EVERY 4 HOURS PRN
Qty: 28 TAB | Refills: 0 | Status: SHIPPED | OUTPATIENT
Start: 2019-02-21 | End: 2019-02-28

## 2019-02-21 RX ORDER — TRAMADOL HYDROCHLORIDE 50 MG/1
50 TABLET ORAL EVERY 6 HOURS PRN
Status: DISCONTINUED | OUTPATIENT
Start: 2019-02-21 | End: 2019-02-21

## 2019-02-21 RX ORDER — BUTALBITAL, ACETAMINOPHEN AND CAFFEINE 50; 325; 40 MG/1; MG/1; MG/1
1 TABLET ORAL EVERY 6 HOURS PRN
Status: DISCONTINUED | OUTPATIENT
Start: 2019-02-21 | End: 2019-02-21 | Stop reason: HOSPADM

## 2019-02-21 RX ADMIN — PNEUMOCOCCAL VACCINE POLYVALENT 25 MCG
25; 25; 25; 25; 25; 25; 25; 25; 25; 25; 25; 25; 25; 25; 25; 25; 25; 25; 25; 25; 25; 25; 25 INJECTION, SOLUTION INTRAMUSCULAR; SUBCUTANEOUS at 12:18

## 2019-02-21 RX ADMIN — DIPHENHYDRAMINE HYDROCHLORIDE 25 MG: 50 INJECTION, SOLUTION INTRAMUSCULAR; INTRAVENOUS at 02:09

## 2019-02-21 RX ADMIN — MINERAL OIL AND WHITE PETROLATUM 1 APPLICATION: 150; 830 OINTMENT OPHTHALMIC at 05:45

## 2019-02-21 RX ADMIN — ACETAMINOPHEN 650 MG: 325 TABLET, FILM COATED ORAL at 05:44

## 2019-02-21 RX ADMIN — INSULIN HUMAN 2 UNITS: 100 INJECTION, SOLUTION PARENTERAL at 08:33

## 2019-02-21 RX ADMIN — METOPROLOL TARTRATE 25 MG: 25 TABLET, FILM COATED ORAL at 05:48

## 2019-02-21 RX ADMIN — BUTALBITAL, ACETAMINOPHEN, AND CAFFEINE 1 TABLET: 50; 325; 40 TABLET ORAL at 11:11

## 2019-02-21 RX ADMIN — ACETAMINOPHEN 650 MG: 325 TABLET, FILM COATED ORAL at 12:16

## 2019-02-21 RX ADMIN — INSULIN HUMAN 1 UNITS: 100 INJECTION, SOLUTION PARENTERAL at 14:04

## 2019-02-21 RX ADMIN — POTASSIUM CHLORIDE, DEXTROSE MONOHYDRATE AND SODIUM CHLORIDE: 150; 5; 900 INJECTION, SOLUTION INTRAVENOUS at 08:48

## 2019-02-21 RX ADMIN — ONDANSETRON 4 MG: 2 INJECTION INTRAMUSCULAR; INTRAVENOUS at 06:23

## 2019-02-21 RX ADMIN — NATEGLINIDE 120 MG: 120 TABLET, COATED ORAL at 05:46

## 2019-02-21 RX ADMIN — OMEPRAZOLE 20 MG: 20 CAPSULE, DELAYED RELEASE ORAL at 05:46

## 2019-02-21 ASSESSMENT — ENCOUNTER SYMPTOMS
WEAKNESS: 0
SHORTNESS OF BREATH: 0
FALLS: 0
NERVOUS/ANXIOUS: 0
ABDOMINAL PAIN: 0
CHILLS: 0
FEVER: 0
COUGH: 0
NAUSEA: 1
PALPITATIONS: 0
HEADACHES: 0
HEADACHES: 1
SEIZURES: 0
HEMOPTYSIS: 0
VOMITING: 0
NAUSEA: 0

## 2019-02-21 NOTE — CONSULTS
Hospital Medicine Consultation    Date of Service  2/20/2019    Referring Physician  James Vitale M.D.    Consulting Physician  Blank Linder M.D.    Reason for Consultation  Medical management    History of Presenting Illness  78 y.o. male who presented 2/20/2019 with medical management status post IR embolization arteriovenous malformation of the brain via a right femoral arterial access by Dr. Vitale on 2/20/19, patient was seen postop in recovery.  The patient originally had been having constant pulsatile heartbeat in his right ear for the last 6 months.  He has been followed by Dr. Olson for bilateral hearing loss.  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  The patient does have a history of diabetes managed with Starlix medication no insulin.  He also has controlled hypertension he has no history of atrial fibrillation.  However postoperatively he was found to be in atrial fibrillation with heart rate 110.  I have ordered sliding scale insulin as needed glucose checks TSH and free T4 metoprolol 25 mg twice daily holding Cozaar for better rate control.  As far as anticoagulation do not feel that patient postoperatively needs any anticoagulation at this time for his intermittent atrial fibrillation.  I have ordered for cardiac telemetry.  I have placed him on a diabetic diet.    Review of Systems  Review of Systems   Constitutional: Negative for chills, diaphoresis, fever and malaise/fatigue.   HENT: Positive for hearing loss. Negative for congestion and sore throat.    Eyes: Negative for pain and discharge.   Respiratory: Negative for cough, hemoptysis, sputum production, shortness of breath and wheezing.    Cardiovascular: Negative for chest pain, palpitations, claudication and leg swelling.   Gastrointestinal: Negative for abdominal pain, constipation, diarrhea, melena, nausea and vomiting.   Genitourinary: Negative for dysuria, frequency and urgency.    Musculoskeletal: Negative for back pain, joint pain, myalgias and neck pain.   Skin: Negative for itching and rash.   Neurological: Negative for dizziness, sensory change, speech change, focal weakness, loss of consciousness, weakness and headaches.   Endo/Heme/Allergies: Does not bruise/bleed easily.   Psychiatric/Behavioral: Negative for depression, substance abuse and suicidal ideas.       Past Medical History   has a past medical history of Arthritis; Cataract; Diabetes (HCC); Guillain-Hartshorn (HCC); Heart burn; and Hypertension.    Surgical History   has a past surgical history that includes other (1981); other (2001); other (2005); and other (2009).    Family History  family history includes Arthritis in his brother, father, mother, and sister; Cancer in his mother; Diabetes in his brother, mother, and sister; Heart Disease in his brother, father, mother, and sister; Hypertension in his brother, father, mother, and sister.    Social History   reports that he quit smoking about 47 years ago. His smoking use included Cigarettes. He has a 15.00 pack-year smoking history. He has never used smokeless tobacco. He reports that he drinks alcohol. He reports that he does not use drugs.    Medications  Prior to Admission Medications   Prescriptions Last Dose Informant Patient Reported? Taking?   allopurinol (ZYLOPRIM) 100 MG Tab 2/19/2019 at 0530 Patient Yes No   Sig: Take 50 mg by mouth every morning.   cetirizine (ZYRTEC) 10 MG Tab 2/19/2019 at 0530 Patient Yes No   Sig: Take 10 mg by mouth every morning.   fenofibrate (TRICOR) 145 MG Tab 2/19/2019 at 0530 Patient Yes No   Sig: Take 145 mg by mouth every morning.   hydroCHLOROthiazide (HYDRODIURIL) 25 MG Tab 2/19/2019 at 0530 Patient Yes No   Sig: Take 12.5 mg by mouth every morning.   ibuprofen (MOTRIN) 200 MG Tab > 2 weeks at UNknown Patient Yes Yes   Sig: Take 200 mg by mouth every 6 hours as needed for Mild Pain.   losartan (COZAAR) 50 MG Tab 2/19/2019 at 0530  Patient Yes No   Sig: Take 50 mg by mouth every morning.   metFORMIN (GLUCOPHAGE) 500 MG Tab 2/19/2019 at 0530 Patient Yes No   Sig: Take 1,000 mg by mouth 2 times a day, with meals.   nateglinide (STARLIX) 120 MG Tab 2/19/2019 at 0530 Patient Yes No   Sig: Take 120 mg by mouth 2 Times a Day.   omeprazole (PRILOSEC) 10 MG CAPSULE DELAYED RELEASE > 2 nights at PM Patient Yes No   Sig: Take 10 mg by mouth every 48 hours.   triazolam (HALCION) 0.25 MG Tab > 3 nights at PM Patient Yes No   Sig: Take 0.0625 mg by mouth at bedtime as needed.      Facility-Administered Medications: None       Allergies  Allergies   Allergen Reactions   • Gabapentin      Facial swelling.   • Percocet [Oxycodone-Acetaminophen]      Nausea and dizzy   • Vicodin [Hydrocodone-Acetaminophen]      Nausea and dizzy       Physical Exam  Temp:  [36.4 °C (97.5 °F)] 36.4 °C (97.5 °F)  Pulse:  [106] 106  Resp:  [14] 14  BP: (138)/(81) 138/81  SpO2:  [96 %] 96 %    Physical Exam   Constitutional: He is oriented to person, place, and time. He appears well-developed and well-nourished. No distress.   HENT:   Head: Normocephalic and atraumatic.   Mouth/Throat: Oropharynx is clear and moist. No oropharyngeal exudate.   Eyes: Pupils are equal, round, and reactive to light. Conjunctivae and EOM are normal. Right eye exhibits no discharge. Left eye exhibits no discharge. No scleral icterus.   Neck: Normal range of motion. Neck supple. No JVD present. No tracheal deviation present. No thyromegaly present.   Cardiovascular: Normal rate, regular rhythm and normal heart sounds.  Exam reveals no gallop and no friction rub.    No murmur heard.  Pulmonary/Chest: Effort normal and breath sounds normal. No respiratory distress. He has no wheezes. He has no rales. He exhibits no tenderness.   Abdominal: Soft. Bowel sounds are normal. He exhibits no distension and no mass. There is no tenderness. There is no rebound and no guarding.   Musculoskeletal: Normal range of  motion. He exhibits no edema or tenderness.   Lymphadenopathy:     He has no cervical adenopathy.   Neurological: He is alert and oriented to person, place, and time. No cranial nerve deficit. He exhibits normal muscle tone.   Postoperatively alert and oriented x3.  Baseline hearing loss.  Able to answer questions appropriately.  Able to move all 4 extremities   Skin: Skin is warm and dry. No rash noted. He is not diaphoretic. No erythema.   Psychiatric: He has a normal mood and affect. His behavior is normal. Judgment and thought content normal.   Nursing note and vitals reviewed.      Fluids       Laboratory                          Imaging  IR-EMBOLIZE-NEURO-INTRACRANIAL    (Results Pending)       Assessment/Plan  Bilateral hearing loss- (present on admission)   Assessment & Plan    Patient is followed by Dr. Tera Olson for bilateral hearing loss.     New onset atrial fibrillation (HCC)   Assessment & Plan    Post operatively patient had developed atrial fibrillation.  We will check a TSH free T4 EKG start patient on metoprolol 25 mg twice daily hold Cozaar while on metoprolol for rate control.  More than likely it is due to anesthesia and should self convert.  Rate is currently 114.  Telemetry monitoring ordered.  As far as anticoagulation will defer since patient had recent AV M embolization on 2/20.     Gout- (present on admission)   Assessment & Plan    No symptoms of gout at this time.     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  On 2/20 Dr. Vitale performed interventional verbalization of the right arteriovenous malformation via right femoral artery access.     Diabetes mellitus type 2 in nonobese (HCC)- (present on admission)   Assessment & Plan    Patient is managed with Starlix oral medication for  diabetes.  Start diabetic diet sliding scale insulin with Accu-Cheks q. before meals and nightly.  Check a hemoglobin A1c level.

## 2019-02-21 NOTE — OR NURSING
PT ARRIVED TO PACU W/ CHRISTINE IR NURSE AND DR LANG ANESTHESIA.   REPORTS REC'D. RIGHT GROIN SITE C/D/I/SOFT.    PT VERY HARD OF HEARING. ASSISTED PT TO PLACE BILATERAL HEARING  AIDES IN EARS.    SHORTLY AFTER ARRIVAL DR CLARKE  NEUROSURGERY AND  DR. MORGAN AT BEDSIDE.    PT AXOX4. ARNOLD. EQUAL.

## 2019-02-21 NOTE — PROGRESS NOTES
IR RN note:    Site Marked and Confirmed with MD, patient and RN pre procedure   Yann embolization right dural arteriovenous fistual, cerebral angiography by MD Vitale assisted by SABRINA Zaidi and general care provided by MD Durga Lynn ,Right groin femoral artery access site;  embolization right dural arteriovenous fistua   [employer] endovascular company REF# [id number] LOT# [id number]  Right groin sealed with Perclose   ABOTT perclose Proglide suture-mediated closure system 6 fr REF# [id number] LOT# [id number]  Pt moving all extremities    RLE +2  pulses pre procedure   RLE +2  pulses post procedure    Patient tolerated procedure, hemodynamically stable; pt awake and talking post procedure; [facility] Lynn;   [facility]/ MD monitored then transferred care to report RN

## 2019-02-21 NOTE — CARE PLAN
Problem: Safety  Goal: Will remain free from injury  Outcome: PROGRESSING AS EXPECTED  Pt with call light in hand, bed locked in lowest position, educated to call for all needs. Pt's wife at bedside.     Problem: Venous Thromboembolism (VTW)/Deep Vein Thrombosis (DVT) Prevention:  Goal: Patient will participate in Venous Thrombosis (VTE)/Deep Vein Thrombosis (DVT)Prevention Measures    Intervention: Ensure patient wears graduated elastic stockings (MARU hose) and/or SCDs, if ordered, when in bed or chair (Remove at least once per shift for skin check)  UT with SCD's in place. Pt ROM and OOB as tolerated.       Problem: Pain Management  Goal: Pain level will decrease to patient's comfort goal  Pt receiving Fioricet and tylenol for pain with good relief.

## 2019-02-21 NOTE — ASSESSMENT & PLAN NOTE
Status post angiogram and embolization by Dr. Vitale  Neurologic exam is intact  We will try tramadol for headacheNot relieved by Tylenol

## 2019-02-21 NOTE — ASSESSMENT & PLAN NOTE
Metoprolol was added for A. fib rate control  Continue losartan    Hold hydrochlorothiazide at this time given nausea poor intake and monitor blood pressure

## 2019-02-21 NOTE — OR NURSING
"TRANSPORTED TO RICU 104 W/ O2 AND  MONITOR.  CHRISTINA RN AT BEDSIDE. PT CONDITION UNCHANGED FROM PACU.    REPORTS HEADACHE..\" ABOUT THE SAME\" REPORTS THAT NAUSEA '' COMES NAD GOES\"  BP HAS BEEN <160 SYSTOLIC.  ATRIAL FIB 80'S. O2 SAT 90-94% 6 L OXYMASK.  "

## 2019-02-21 NOTE — PROGRESS NOTES
[facility] Medicine Daily Progress Note    Date of Service  2/21/2019    Chief Complaint  78 y.o. 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 Developed AFib post op    Hospital Course          Interval Problem Update    AOx4  AFib 60-70  -145  Nauseated        Consultants/Specialty  NSGY  CC    Code Status  Full    Disposition  TBD    Review of Systems  Review of Systems   Constitutional: Negative for chills and fever.   Respiratory: Negative for cough, hemoptysis and shortness of breath.    Cardiovascular: Negative for chest pain and palpitations.   Gastrointestinal: Positive for nausea. Negative for abdominal pain and vomiting.   Neurological: Positive for headaches.   All other systems reviewed and are negative.       Physical Exam  Temp:  [35.9 °C (96.6 °F)-36.7 °C (98 °F)] 36.3 °C (97.3 °F)  Pulse:  [] 70  Resp:  [10-39] 26  BP: (138)/(81) 138/81  SpO2:  [89 %-98 %] 92 %    Physical Exam   Constitutional: He is oriented to person, place, and time. He appears well-developed and well-nourished.   HENT:   Head: Normocephalic and atraumatic.   Right Ear: External ear normal.   Left Ear: External ear normal.   Mouth/Throat: No oropharyngeal exudate.   Eyes: Conjunctivae are normal. Right eye exhibits no discharge. Left eye exhibits no discharge. No scleral icterus.   Neck: Neck supple. No JVD present. No tracheal deviation present.   Cardiovascular: Normal rate.  An irregularly irregular rhythm present. Exam reveals no gallop and no friction rub.    No murmur heard.  Pulmonary/Chest: Effort normal and breath sounds normal. No stridor. No respiratory distress. He has no wheezes. He has no rales. He exhibits no tenderness.   Abdominal: Soft. Bowel sounds are normal. He exhibits no distension and no mass. There is no tenderness. There is no rebound and no guarding.   Musculoskeletal: He exhibits no edema or tenderness.   Neurological: He is alert and oriented to person,  place, and time. No cranial nerve deficit. He exhibits normal muscle tone.   Skin: Skin is warm and dry. He is not diaphoretic. No cyanosis. Nails show no clubbing.   Psychiatric: He has a normal mood and affect. His behavior is normal. Thought content normal.   Nursing note and vitals reviewed.      Fluids    Intake/Output Summary (Last 24 hours) at 02/21/19 0930  Last data filed at 02/21/19 0600   Gross per 24 hour   Intake          1976.66 ml   Output             1235 ml   Net           741.66 ml       Laboratory  Recent Labs      02/21/19   0403   WBC  7.3   RBC  4.49*   HEMOGLOBIN  14.7   HEMATOCRIT  44.2   MCV  98.4*   MCH  32.7   MCHC  33.3*   RDW  49.6   PLATELETCT  157*   MPV  10.8     Recent Labs      02/21/19   0054   SODIUM  138   POTASSIUM  3.8   CHLORIDE  107   CO2  19*   GLUCOSE  232*   BUN  21   CREATININE  1.04   CALCIUM  8.6                   Imaging  IR-EMBOLIZE-NEURO-INTRACRANIAL    (Results Pending)        Assessment/Plan  AVM (arteriovenous malformation) brain- (present on admission)   Assessment & Plan    Status post angiogram and embolization by Dr. Vitale  Neurologic exam is intact  We will try tramadol for headacheNot relieved by Tylenol     Essential hypertension   Assessment & Plan    Metoprolol was added for A. fib rate control  Continue losartan    Hold hydrochlorothiazide at this time given nausea poor intake and monitor blood pressure     Bilateral hearing loss- (present on admission)   Assessment & Plan    Patient is followed by Dr. Tera Olson for bilateral hearing loss.     New onset atrial fibrillation (HCC)   Assessment & Plan    TSH is normal  We will check echocardiogram    He would benefit from chronic anticoagulation for stroke risk reduction when risk of bleeding is deemed acceptable by Dr. Vitale    This was discussed with the patient and recommended he follows up with his primary care provider after discharge and discuss anticoagulation once cleared by neurosurgery      Gout- (present on admission)   Assessment & Plan    Stable on allopurinol     Diabetes mellitus type 2 in nonobese (HCC)- (present on admission)   Assessment & Plan    Sliding scale insulin monitor CBGs          VTE prophylaxis: SCD

## 2019-02-21 NOTE — PROGRESS NOTES
Critical Care Progress Note    Date of admission  2/20/2019    Chief Complaint  78 y.o. male who presented 2/20/2019 with HTN, DM, gerd, hearing loss, that has had some 6 months of heart beating sound in his right ear that was found to have right dura AV fistula s/p embolization by Dr Vitale today 2/20 that is brought to ICU for neuro monitoring. Currently has a frontal headache and nausea but gary numbness, tingling weakness or vision changes or difficult with speech. Patient gary history of irregular heart beat or afib.    Hospital Course    Admit to ICU      Interval Problem Update  Reviewed last 24 hour events:  No acute changes post operatively  Afebrile  No hypotension  No tachycardia  Alert, oriented  Hemodynamically stable    Review of Systems  Review of Systems   Constitutional: Negative for chills, fever and malaise/fatigue.   Respiratory: Negative for cough and shortness of breath.    Cardiovascular: Negative for chest pain and palpitations.   Gastrointestinal: Negative for abdominal pain, nausea and vomiting.   Genitourinary: Negative for dysuria and urgency.   Musculoskeletal: Negative for falls.   Skin: Negative for rash.   Neurological: Negative for seizures, weakness and headaches.   Psychiatric/Behavioral: The patient is not nervous/anxious.         Vital Signs for last 24 hours   Temp:  [35.9 °C (96.6 °F)-36.7 °C (98 °F)] 36.3 °C (97.3 °F)  Pulse:  [] 70  Resp:  [10-39] 26  BP: (138)/(81) 138/81  SpO2:  [89 %-98 %] 92 %    Hemodynamic parameters for last 24 hours       Respiratory Information for the last 24 hours       Physical Exam   Physical Exam   Constitutional: He is oriented to person, place, and time. He appears well-nourished. No distress.   HENT:   Head: Normocephalic and atraumatic.   Mouth/Throat: Oropharynx is clear and moist.   Eyes: Conjunctivae are normal.   Neck: Neck supple.   Cardiovascular: Normal rate, regular rhythm, normal heart sounds and intact distal pulses.     No murmur heard.  Pulmonary/Chest: Breath sounds normal. No respiratory distress. He has no wheezes. He has no rales.   Abdominal: Soft. Bowel sounds are normal. He exhibits no distension. There is no tenderness. There is no rebound and no guarding.   Musculoskeletal: He exhibits no edema or tenderness.   Neurological: He is alert and oriented to person, place, and time. No cranial nerve deficit. He exhibits normal muscle tone. Coordination normal.   Skin: Skin is warm. No rash noted. No erythema.   Psychiatric: He has a normal mood and affect. His behavior is normal. Thought content normal.   Nursing note and vitals reviewed.      Medications  Current Facility-Administered Medications   Medication Dose Route Frequency Provider Last Rate Last Dose   • allopurinol (ZYLOPRIM) tablet 50 mg  50 mg Oral ROB Vitale M.D.   Stopped at 02/21/19 0600   • cetirizine (ZYRTEC) tablet 10 mg  10 mg Oral ROB Vitale M.D.   Stopped at 02/21/19 0600   • fenofibrate micronized (LOFIBRA) capsule 134 mg  134 mg Oral ROB Vitale M.D.   Stopped at 02/21/19 0600   • hydroCHLOROthiazide (HYDRODIURIL) tablet 12.5 mg  12.5 mg Oral ROB Vitale M.D.   Stopped at 02/21/19 0600   • nateglinide (STARLIX) tablet 120 mg  120 mg Oral BID James Vitale M.D.   120 mg at 02/21/19 0546   • omeprazole (PRILOSEC) capsule 20 mg  20 mg Oral Q48HRS James Vitale M.D.   20 mg at 02/21/19 0546   • Pharmacy Consult Request ...Pain Management Review 1 Each  1 Each Other PHARMACY TO DOSE James Vitale M.D.       • MD ALERT...DO NOT ADMINISTER NSAIDS or ASPIRIN unless ORDERED By Neurosurgery 1 Each  1 Each Other PRN James Vitale M.D.       • ondansetron (ZOFRAN) syringe/vial injection 4 mg  4 mg Intravenous Q4HRS PRN James Vitale M.D.   4 mg at 02/21/19 0623   • diphenhydrAMINE (BENADRYL) injection 25 mg  25 mg Intravenous Q6HRS PRN James Vitale M.D.   25 mg at 02/21/19 0209   • scopolamine  (TRANSDERM-SCOP) patch 1 Patch  1 Patch Transdermal Q72HRS PRN James Vitale M.D.       • labetalol (NORMODYNE,TRANDATE) injection 10 mg  10 mg Intravenous Q HOUR PRN James Vitale M.D.       • hydrALAZINE (APRESOLINE) injection 10 mg  10 mg Intravenous Q HOUR PRN James Vitale M.D.       • cloNIDine (CATAPRES) tablet 0.1 mg  0.1 mg Oral Q4HRS PRN James Vitale M.D.       • niCARdipine (CARDENE) 25 mg in  mL Infusion  0-15 mg/hr Intravenous Continuous James Vitale M.D.   Stopped at 02/20/19 2315   • docusate sodium (COLACE) capsule 100 mg  100 mg Oral BID James Vitale M.D.   Stopped at 02/20/19 2315   • senna-docusate (PERICOLACE or SENOKOT S) 8.6-50 MG per tablet 1 Tab  1 Tab Oral Nightly James Vitale M.D.   Stopped at 02/20/19 2315   • senna-docusate (PERICOLACE or SENOKOT S) 8.6-50 MG per tablet 1 Tab  1 Tab Oral Q24HRS PRN James Vitale M.D.       • polyethylene glycol/lytes (MIRALAX) PACKET 1 Packet  1 Packet Oral BID PRN James Vitale M.D.       • magnesium hydroxide (MILK OF MAGNESIA) suspension 30 mL  30 mL Oral QDAY PRN James Vitale M.D.       • bisacodyl (DULCOLAX) suppository 10 mg  10 mg Rectal Q24HRS PRN James Vitale M.D.       • fleet enema 133 mL  1 Each Rectal Once PRN James Vitale M.D.       • artificial tear ointment (REFRESH,LACRI-LUBE) 1 Application  1 Application Both Eyes Q8HRS James Vitale M.D.   1 Application at 02/21/19 0545   • dextrose 5 % and 0.9 % NaCl with KCl 20 mEq infusion   Intravenous Continuous James Vitale M.D. 100 mL/hr at 02/21/19 0848     • acetaminophen (TYLENOL) tablet 650 mg  650 mg Oral Q6HRS James Vitale M.D.   650 mg at 02/21/19 0544   • oxyCODONE immediate-release (ROXICODONE) tablet 2.5 mg  2.5 mg Oral Q3HRS PRN James Vitale M.D.       • oxyCODONE immediate-release (ROXICODONE) tablet 5 mg  5 mg Oral Q3HRS PRN James Vitale M.D.       • morphine (pf) 4 mg/ml injection 2 mg  2 mg  Intravenous Q HOUR PRN James Vitale M.D.   2 mg at 02/20/19 2311   • metoprolol (LOPRESSOR) tablet 25 mg  25 mg Oral TWICE DAILY Blank Linder M.D.   25 mg at 02/21/19 0548   • insulin regular (HUMULIN R) injection 1-6 Units  1-6 Units Subcutaneous 4X/DAY KT Linder M.D.   2 Units at 02/21/19 0833    And   • glucose 4 g chewable tablet 16 g  16 g Oral Q15 MIN PRN Blank Linder M.D.        And   • dextrose 50% (D50W) injection 25 mL  25 mL Intravenous Q15 MIN PRN Blank Linder M.D.       • Respiratory Care per Protocol   Nebulization Continuous RT Blank Linder M.D.       • acetaminophen (TYLENOL) tablet 650 mg  650 mg Oral Q6HRS PRN Blank Linder M.D.           Fluids    Intake/Output Summary (Last 24 hours) at 02/21/19 0945  Last data filed at 02/21/19 0600   Gross per 24 hour   Intake          1976.66 ml   Output             1235 ml   Net           741.66 ml       Laboratory      Recent Labs      02/21/19   0054   TROPONINI  <0.01     Recent Labs      02/21/19   0054   SODIUM  138   POTASSIUM  3.8   CHLORIDE  107   CO2  19*   BUN  21   CREATININE  1.04   MAGNESIUM  1.7   CALCIUM  8.6     Recent Labs      02/21/19   0054   ALTSGPT  19   ASTSGOT  31   ALKPHOSPHAT  42   TBILIRUBIN  0.7   GLUCOSE  232*     Recent Labs      02/21/19 0054  02/21/19   0403   WBC   --   7.3   NEUTSPOLYS   --   91.40*   LYMPHOCYTES   --   7.50*   MONOCYTES   --   0.70   EOSINOPHILS   --   0.00   BASOPHILS   --   0.10   ASTSGOT  31   --    ALTSGPT  19   --    ALKPHOSPHAT  42   --    TBILIRUBIN  0.7   --      Recent Labs      02/21/19   0403   RBC  4.49*   HEMOGLOBIN  14.7   HEMATOCRIT  44.2   PLATELETCT  157*       Imaging  Echo:   Reviewed    Assessment/Plan  AVM (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   Aspiration precautions  Doing well this am  Ok for PO intake  Increase mobility              New onset atrial fibrillation  (HCC)   Assessment & Plan    Converted to NSR  Hemodynamically stable.        Diabetes mellitus type 2 in nonobese (HCC)- (present on admission)   Assessment & Plan    Keep -180        Ok to leave ICU from my standpoint    VTE:  Heparin  Ulcer: Not Indicated  Lines: None    I have performed a physical exam and reviewed and updated ROS and Plan today (2/21/2019). In review of yesterday's note (2/20/2019), there are no changes except as documented above.     Discussed patient condition and risk of morbidity and/or mortality with Family, RN, RT, Charge nurse / hot rounds and Patient  .

## 2019-02-21 NOTE — ASSESSMENT & PLAN NOTE
TSH is normal  We will check echocardiogram    He would benefit from chronic anticoagulation for stroke risk reduction when risk of bleeding is deemed acceptable by Dr. Vitale    This was discussed with the patient and recommended he follows up with his primary care provider after discharge and discuss anticoagulation once cleared by neurosurgery

## 2019-02-21 NOTE — CARE PLAN
Problem: Infection  Goal: Will remain free from infection    Intervention: Implement standard precautions and perform hand washing before and after patient contact  Standard precautions in place      Problem: Venous Thromboembolism (VTW)/Deep Vein Thrombosis (DVT) Prevention:  Goal: Patient will participate in Venous Thrombosis (VTE)/Deep Vein Thrombosis (DVT)Prevention Measures    Intervention: Ensure patient wears graduated elastic stockings (MARU hose) and/or SCDs, if ordered, when in bed or chair (Remove at least once per shift for skin check)  SCDs in place

## 2019-02-21 NOTE — PROGRESS NOTES
· 2 RN skin check complete with MILES Tavares.  · Devices in place ICU monitoring equipment, art line, PIV, and SCDs.  · Skin assessed under devices.  · No Confirmed pressure ulcers found.  · No new potential pressure ulcers noted.  · The following interventions in place Q two hr turns, pillows for support, repositioning of equipment

## 2019-02-21 NOTE — PROGRESS NOTES
Neurosurgery Progress Note    Subjective:  No acute events. Sitting up in bed, mild headache.     Exam:  A&O x3. Fluent Speech.   4 PERRL, EOMI. Denies blurry or double vision.   Tongue midline without fasciculation. No facial droop.   Hearing intact.   Strength: Bilateral shoulder shrug, bicep, tricep, deltoid,  5/5.                  Bilateral IP, DF, PF 5/5.   Sensation: Intact throughout.   Eating, drinking. Denies n/v.  Voiding.  Pain controlled.       Pulse  Av.9  Min: 59  Max: 126  Resp  Av.8  Min: 10  Max: 39  Temp  Av.3 °C (97.4 °F)  Min: 35.9 °C (96.6 °F)  Max: 36.7 °C (98 °F)  Monitored Temp 2  Av.3 °C (97.4 °F)  Min: 36.3 °C (97.3 °F)  Max: 36.5 °C (97.7 °F)  SpO2  Av.2 %  Min: 89 %  Max: 98 %    No Data Recorded    Recent Labs      19   0403   WBC  7.3   RBC  4.49*   HEMOGLOBIN  14.7   HEMATOCRIT  44.2   MCV  98.4*   MCH  32.7   MCHC  33.3*   RDW  49.6   PLATELETCT  157*   MPV  10.8     Recent Labs      19   0054   SODIUM  138   POTASSIUM  3.8   CHLORIDE  107   CO2  19*   GLUCOSE  232*   BUN  21   CREATININE  1.04   CALCIUM  8.6               Intake/Output       19 07 - 19 0659 19 07 - 19 0659      0-0659 Total 8667-9346 6363-1461 Total       Intake    P.O.  --  90 90  960  -- 960    P.O. -- 90 90 960 -- 960    I.V.  --  1886.7 1886.7  400  -- 400    Crystalloid Intake -- 1150 1150 -- -- --    Volume (mL) (dextrose 5 % and 0.9 % NaCl with KCl 20 mEq infusion) -- 643.3 643.3 400 -- 400    Volume (mL) (lactated ringers infusion) -- 93.3 93.3 -- -- --    Total Intake -- .7 .7 1360 -- 1360       Output    Urine  --  1235 1235  550  -- 550    Output (mL) ([REMOVED] Urethral Catheter Temperature probe 16 Fr.) -- 1235 1235 550 -- 550    Total Output -- 1235 1235 550 -- 550       Net I/O     -- 741.7 741.7 810 -- 810            Intake/Output Summary (Last 24 hours) at 19 1342  Last data filed at 19 1200   Gross  per 24 hour   Intake          3336.66 ml   Output             1785 ml   Net          1551.66 ml            • acetaminophen/caffeine/butalbital 325-40-50 mg  1 Tab Q6HRS PRN   • allopurinol  50 mg QAM   • cetirizine  10 mg QAM   • fenofibrate micronized  134 mg QAM   • nateglinide  120 mg BID   • omeprazole  20 mg Q48HRS   • Pharmacy Consult Request  1 Each PHARMACY TO DOSE   • MD ALERT...DO NOT ADMINISTER NSAIDS or ASPIRIN unless ORDERED By Neurosurgery  1 Each PRN   • ondansetron  4 mg Q4HRS PRN   • diphenhydrAMINE  25 mg Q6HRS PRN   • scopolamine  1 Patch Q72HRS PRN   • labetalol  10 mg Q HOUR PRN   • hydrALAZINE  10 mg Q HOUR PRN   • cloNIDine  0.1 mg Q4HRS PRN   • niCARdipine infusion  0-15 mg/hr Continuous   • docusate sodium  100 mg BID   • senna-docusate  1 Tab Nightly   • senna-docusate  1 Tab Q24HRS PRN   • polyethylene glycol/lytes  1 Packet BID PRN   • magnesium hydroxide  30 mL QDAY PRN   • bisacodyl  10 mg Q24HRS PRN   • fleet  1 Each Once PRN   • artificial tear ointment  1 Application Q8HRS   • acetaminophen  650 mg Q6HRS   • oxyCODONE immediate-release  2.5 mg Q3HRS PRN   • oxyCODONE immediate-release  5 mg Q3HRS PRN   • morphine injection  2 mg Q HOUR PRN   • metoprolol  25 mg TWICE DAILY   • insulin regular  1-6 Units 4X/DAY ACHS    And   • glucose  16 g Q15 MIN PRN    And   • dextrose 50%  25 mL Q15 MIN PRN   • Respiratory Care per Protocol   Continuous RT   • acetaminophen  650 mg Q6HRS PRN       Assessment and Plan:  Hospital day #1  POD #1  Prophylactic anticoagulation: no         Start date/time: tbd    Plan:  Oral pain medication for pain control.   Patient ok to dc if tolerating oral pain medication and headaches controlled.

## 2019-02-21 NOTE — OP REPORT
NEUROSURGERY OPERATIVE NOTE  DATE:  10:56 PM 2019    PATIENT NAME:  Howard Francois   1940 MRN [id number]      PROCEDURE:  1.  Right common carotid artery angiography  2.  Right internal carotid artery angiography  3.  Right external carotid artery angiography  4.  Selective angiography right occipital artery  5.  Right vertebral artery angiography  6.  Yann liquid embolic device infusion right occipital artery x4  7.  Ultrasound-guided right common femoral arteriotomy  8.  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  Imaging has demonstrated a right dural arterial venous fistula supplied primarily by the occipital artery draining into the transverse sinus.  A small amount of arterial supply is also from the dorsal meningeal artery of the internal carotid artery and the cervical right vertebral artery.  [place] embolization is planned for treatment.    Procedure:  The patient was identified in the holding area, and the surgery site marked, consent was obtained.  The patinet was brought back to the angiography suite and intubated by anesthesia service.  2 grams Ancef was administered intravenously.  He was transferred to the angiography table in a supine manner.  His bilateral groin regions were prepped with hair clipping and ChloraPrep.  Sterile drapes were applied.  The right common femoral artery was identified using palpation and ultrasound.  A 6 Iranian sheath was placed in the right common femoral artery under ultrasound guidance.  The sheath was flushed with heparinized saline.  Standard Seldinger technique was utilized in a single wall puncture was performed.    A 6 Iranian neuron catheter was advanced over a 6  [place]ugu Berenstein catheter and Glidewire, and positioned within the right common carotid artery.  Oblique and lateral views of the cervical carotid arteries were obtained.  The sheath was then positioned within the right external carotid artery using roadmap guidance.  A Headway Duo microcatheter ([employer]) was advanced over a Synchro 2 microwire, and positioned within a branch of the occipital artery which was coursing superior and then inferior/posterior to the right transverse sinus.  Selective angiography through the microcatheter demonstrated supply to the fistula draining into the transverse sinus.  The microcatheter was flushed slowly with DMSO, and DMSO slowly infused over several minutes to fill the dead space of the microcatheter (0.35 mils).  Yann 18 was then slowly infused on Celia roadmap, infusion was stopped multiple times to form a plug at the catheter tip at which time [place] began percolating through the fistula.  Infusion was continued until reflux occurred to the second marker on the microcatheter.  The plunger on the Charlotte syringe was pulled back the catheter removed intact.  Follow-up angiography demonstrated decreased flow to the fistula.  A second Headway Duo microcatheter ([employer]) was then advanced into a second occipital arterial branch, selective angiography again demonstrated isolated supply of this artery to the fistula.  Charlotte 18 and fusion was performed as described above.  Follow-up angiography demonstrated further decrease in flow to the transverse sinus via the fistula.  A third Headway Due oh (micro-mention) microcatheter was then positioned within the distal occipital artery under roadmap guidance.  Selective angiography demonstrated supply of this vessel to the fistula.  [place] 18 was again slowly infused through the microcatheter.  Some penetration into the fistula occurred, however fairly significant reflux occurred.  The catheter was removed due to reflux.  A fourth  Headway Duo (micro Venson) catheter was then advanced into a fourth arterial branch originating off the occipital artery.  Selective angiography demonstrated isolated supply of this arterial branch to the fistula.  Yann 18 infusion was again carried out as described above.  Good penetration into the fistula was achieved.  The catheter was removed.  Final angiography demonstrated significant, near complete, obliteration of early venous filling into the transverse sinus.  The neuron catheter was then positioned within the right internal carotid artery.  AP and lateral views of the intracranial circulation were obtained, this demonstrated no fistulous communication.  The neuron catheter was withdrawn into the aortic arch, and position within the right vertebral artery over a 6 [place] Berenstein catheter.  Angiography demonstrated antegrade opacification of the posterior circulation with no arterial supply to the fistula.  The catheter was removed.  Contrast injection was performed through the sheath demonstrated good puncture position.  The sheath was removed over a [place] wire and hemostasis obtained with a 6 Bol[place]ian Perclose device.    FINDINGS:  The right common carotid artery injection demonstrates anterograde opacification of the cervical carotid arteries.  The occipital artery is quite large, and has several arterial branches draining into the right transverse sinus and the fistulous manner.    Right occipital artery injection demonstrates rapid opacification of the transverse sinus with drainage into the petrosal sinus.  This is consistent with a dural arterial venous fistula.  Multiple branches originating off the occipital artery are supplying the fistula.  Good embolization using [place] 18 achieved through 3 small branches and as well as the main trunk of the occipital artery, with near complete obliteration of the fistula achieved.    The right internal carotid artery injection demonstrates anterograde  opacification in the anterior middle cerebral arteries.  No opacification of the fistula occurs.  No opacification of the anterior posterior communicating arteries occurs.  The capillary and venous phases are unremarkable.  No early venous filling occurs.    The right vertebral artery injection demonstrates anterograde opacification of the posterior circulation.  No opacification of the fistula occurs.  The capillary and venous phases are unremarkable.  No early venous filling occurs.    Summary: Right arterial dural venous fistula with arterial supply via the occipital artery and several branches of this artery running in a apparent trans-osseus manner.  Venous drainage through right transverse sinus with drainage into the petrosal sinus.  Near complete obliteration of fistula achieved.    EBL:  5 CC  COMPLICATIONS:  None apparent at end of procedure.

## 2019-02-21 NOTE — PROGRESS NOTES
AM bedside report received from NOC RN. Plan of care discussed. Lines labs med's and orders reviewed. Neuro assessment completed with NOC RN. Pt awake alert oriented x. ARNOLD. No needs at this time. Pt with call light in hand and educated to call for al needs.

## 2019-02-21 NOTE — ASSESSMENT & PLAN NOTE
S/p AVM embolization date: 2/20  Hemodynamically stable.   Aspiration precautions  Doing well this am  Ok for PO intake  Increase mobility

## 2019-02-21 NOTE — CONSULTS
Critical Care Consultation    Date of consult: 2/20/2019    Referring Physician  James Vitale M.D.    Reason for Consultation  S/p AVM embolization and neuromonitoring    History of Presenting Illness  78 y.o. male who presented 2/20/2019 with HTN, DM, gerd, hearing loss, that has had some 6 months of heart beating sound in his right ear that was found to have right dura AV fistula s/p embolization by Dr Vitale today 2/20 that is brought to ICU for neuro monitoring. Currently has a frontal headache and nausea but gary numbness, tingling weakness or vision changes or difficult with speech. Patient gary history of irregular heart beat or afib.     Code Status  Full Code    Review of Systems  Review of Systems   Constitutional: Negative for chills, diaphoresis and fever.   HENT: Positive for hearing loss. Negative for congestion and ear pain.    Eyes: Negative for blurred vision and double vision.   Respiratory: Negative for hemoptysis and shortness of breath.    Cardiovascular: Negative for chest pain, palpitations and PND.   Gastrointestinal: Negative for heartburn, nausea and vomiting.   Musculoskeletal: Negative for myalgias.   Neurological: Positive for headaches. Negative for dizziness, tremors, sensory change, speech change, focal weakness, loss of consciousness and weakness.   All other systems reviewed and are negative.      Past Medical History   has a past medical history of Arthritis; Cataract; Diabetes (HCC); Gu[place]ain[place] (HCC); Heart burn; and Hypertension.    Surgical History   has a past surgical history that includes other (1981); other (2001); other (2005); and other (2009).    Family History  family history includes Arthritis in his brother, father, mother, and sister; Cancer in his mother; Diabetes in his brother, mother, and sister; Heart Disease in his brother, father, mother, and sister; Hypertension in his brother, father, mother, and sister.    Social History   reports that he quit  smoking about 47 years ago. His smoking use included Cigarettes. He has a 15.00 pack-year smoking history. He has never used smokeless tobacco. He reports that he drinks alcohol. He reports that he does not use drugs.    Medications  Home Medications     Reviewed by Moisés Kim (Pharmacy Tech) on 02/20/19 at 1411  Med List Status: Complete   Medication Last Dose Status   allopurinol (ZYLOPRIM) 100 MG Tab 2/19/2019 Active   cetirizine (ZYRTEC) 10 MG Tab 2/19/2019 Active   fenofibrate (TRICOR) 145 MG Tab 2/19/2019 Active   hydroCHLOROthiazide (HYDRODIURIL) 25 MG Tab 2/19/2019 Active   ibuprofen (MOTRIN) 200 MG Tab > 2 weeks Active   losartan (COZAAR) 50 MG Tab 2/19/2019 Active   metFORMIN (GLUCOPHAGE) 500 MG Tab 2/19/2019 Active   nateglinide (STARLIX) 120 MG Tab 2/19/2019 Active   omeprazole (PRILOSEC) 10 MG CAPSULE DELAYED RELEASE > 2 nights Active   triazolam (HALCION) 0.25 MG Tab > 3 nights Active              Current Facility-Administered Medications   Medication Dose Route Frequency Provider Last Rate Last Dose   • acetaminophen (TYLENOL) tablet 1,000 mg  1,000 mg Oral Once Durga Lynn M.D.       • FENTANYL CITRATE (PF) 0.05 MG/ML INJ SOLN            • [START ON 2/21/2019] allopurinol (ZYLOPRIM) tablet 50 mg  50 mg Oral QAMELINDA Vitale M.D.       • [START ON 2/21/2019] cetirizine (ZYRTEC) tablet 10 mg  10 mg Oral ROB Vitale M.D.       • [START ON 2/21/2019] fenofibrate micronized (LOFIBRA) capsule 134 mg  134 mg Oral ROB Vitale M.D.       • [START ON 2/21/2019] hydroCHLOROthiazide (HYDRODIURIL) tablet 12.5 mg  12.5 mg Oral QAMELINDA Vitale M.D.       • [START ON 2/21/2019] nateglinide (STARLIX) tablet 120 mg  120 mg Oral BID James Vitale M.D.       • omeprazole (PRILOSEC) capsule 20 mg  20 mg Oral Q48HRS James Vitale M.D.       • Pharmacy Consult Request ...Pain Management Review 1 Each  1 Each Other PHARMACY TO DOSE James Vitale M.D.       • MD  ALERT...DO NOT ADMINISTER NSAIDS or ASPIRIN unless ORDERED By Neurosurgery 1 Each  1 Each Other PRN James Vitale M.D.       • ondansetron (ZOFRAN) syringe/vial injection 4 mg  4 mg Intravenous Q4HRS PRN James Vitale M.D.       • dexamethasone (DECADRON) injection 4 mg  4 mg Intravenous Once PRN James Vitale M.D.       • diphenhydrAMINE (BENADRYL) injection 25 mg  25 mg Intravenous Q6HRS PRN James Vitale M.D.       • scopolamine (TRANSDERM-SCOP) patch 1 Patch  1 Patch Transdermal Q72HRS PRN James Vitale M.D.       • labetalol (NORMODYNE,TRANDATE) injection 10 mg  10 mg Intravenous Q HOUR PRN James Vitale M.D.       • hydrALAZINE (APRESOLINE) injection 10 mg  10 mg Intravenous Q HOUR PRN James Vitale M.D.       • cloNIDine (CATAPRES) tablet 0.1 mg  0.1 mg Oral Q4HRS PRN James Vitale M.D.       • niCARdipine (CARDENE) 25 mg in  mL Infusion  0-15 mg/hr Intravenous Continuous James Vitale M.D.       • docusate sodium (COLACE) capsule 100 mg  100 mg Oral BID James Vitale M.D.       • senna-docusate (PERICOLACE or SENOKOT S) 8.6-50 MG per tablet 1 Tab  1 Tab Oral Nightly James Vitale M.D.       • senna-docusate (PERICOLACE or SENOKOT S) 8.6-50 MG per tablet 1 Tab  1 Tab Oral Q24HRS PRN James Vitale M.D.       • polyethylene glycol/lytes (MIRALAX) PACKET 1 Packet  1 Packet Oral BID PRN James Vitale M.D.       • magnesium hydroxide (MILK OF MAGNESIA) suspension 30 mL  30 mL Oral QDAY PRN James Vitale M.D.       • bisacodyl (DULCOLAX) suppository 10 mg  10 mg Rectal Q24HRS PRN James iVtale M.D.       • fleet enema 133 mL  1 Each Rectal Once PRN James Vitale M.D.       • artificial tear ointment (REFRESH,LACRI-LUBE) 1 Application  1 Application Both Eyes Q8HRS James Vitale M.D.       • dextrose 5 % and 0.9 % NaCl with KCl 20 mEq infusion   Intravenous Continuous James Vitale M.D.       • [START ON 2/21/2019] acetaminophen (TYLENOL)  tablet 650 mg  650 mg Oral Q6HRS aJmes Vitale M.D.       • oxyCODONE immediate-release (ROXICODONE) tablet 2.5 mg  2.5 mg Oral Q3HRS PRN James Vitale M.D.       • oxyCODONE immediate-release (ROXICODONE) tablet 5 mg  5 mg Oral Q3HRS PRN James Vitale M.D.       • morphine (pf) 4 mg/ml injection 2 mg  2 mg Intravenous Q HOUR PRN James Vitale M.D.       • metoprolol (LOPRESSOR) tablet 25 mg  25 mg Oral TWICE DAILY Blank Linder M.D.       • [START ON 2/21/2019] insulin regular (HUMULIN R) injection 1-6 Units  1-6 Units Subcutaneous 4X/DAY ACHDEWAYNE Linder M.D.        And   • glucose 4 g chewable tablet 16 g  16 g Oral Q15 MIN PRN Blank Linder M.D.        And   • dextrose 50% (D50W) injection 25 mL  25 mL Intravenous Q15 MIN PRN Blank Linder M.D.       • senna-docusate (PERICOLACE or SENOKOT S) 8.6-50 MG per tablet 2 Tab  2 Tab Oral BID Blank Linder M.D.        And   • polyethylene glycol/lytes (MIRALAX) PACKET 1 Packet  1 Packet Oral QDAY PRN Blank Linder M.D.        And   • magnesium hydroxide (MILK OF MAGNESIA) suspension 30 mL  30 mL Oral QDAY PRN Blank Linder M.D.        And   • bisacodyl (DULCOLAX) suppository 10 mg  10 mg Rectal QDAY PRN Blank Linder M.D.       • Respiratory Care per Protocol   Nebulization Continuous RT Blank Linder M.D.       • ondansetron (ZOFRAN) syringe/vial injection 4 mg  4 mg Intravenous Q4HRS PRN Blank Linder M.D.       • ondansetron (ZOFRAN ODT) dispertab 4 mg  4 mg Oral Q4HRS PRN Blank Linder M.D.       • acetaminophen (TYLENOL) tablet 650 mg  650 mg Oral Q6HRS PRN Blank Linder M.D.           Allergies  Allergies   Allergen Reactions   • Gabapentin      Facial swelling.   • Percocet [Oxycodone-Acetaminophen]      Nausea and dizzy   • Vicodin [Hydrocodone-Acetaminophen]      Nausea and dizzy       Vital Signs last 24 hours  Temp:  [36.4 °C (97.5 °F)-36.7 °C (98 °F)] 36.7 °C (98 °F)  Pulse:  [] 85  Resp:  [10-28] 16  BP: (138)/(81) 138/81  SpO2:  [89  %-96 %] 92 %    Physical Exam  Physical Exam   Constitutional: He is oriented to person, place, and time. He appears well-developed and well-nourished.   HENT:   Head: Normocephalic and atraumatic.   Eyes: Pupils are equal, round, and reactive to light. EOM are normal.   Neck: No JVD present. No thyromegaly present.   Cardiovascular:   No murmur heard.  Irregular irregular   Pulmonary/Chest: No respiratory distress. He has no wheezes. He has no rales.   Abdominal: He exhibits no distension. There is no tenderness. There is no rebound.   Musculoskeletal: He exhibits no edema or tenderness.   Neurological: He is alert and oriented to person, place, and time. No cranial nerve deficit.   Moves all extremities, no facial asymmetry speech normal   Skin: Skin is warm and dry.   Psychiatric: He has a normal mood and affect.       Fluids    Intake/Output Summary (Last 24 hours) at 02/20/19 2305  Last data filed at 02/20/19 2213   Gross per 24 hour   Intake             1150 ml   Output              650 ml   Net              500 ml       Laboratory  Recent Results (from the past 48 hour(s))   ACCU-CHEK GLUCOSE    Collection Time: 02/20/19  1:41 PM   Result Value Ref Range    Glucose - Accu-Ck 105 (H) 65 - 99 mg/dL       Imaging  IR-EMBOLIZE-NEURO-INTRACRANIAL    (Results Pending)       Assessment/Plan  AVM (arteriovenous malformation) brain- (present on admission)   Assessment & Plan    Procedure: AVM embolization date: 2/20  Neurosurgeon: Dr Vitale  Neuro checks: Q1  Flat post angio per protocol  Aspiration precautions    Vte prophylaxis: SCD's  Seizure prophylaxis: not indicated  Na goal: normonatremia  SBP goal: < 160 using nicardipine gtt   Pre-operative neuro exam: intact  Post-operative neuro exam: intact         New onset atrial fibrillation (HCC)   Assessment & Plan    Post operatively had atrial fib    Will check electrolytes keep Mg > 2 K > 4  Send trop  Get EKG  Start metoprolol likely related to adrenergic tone  may need amiodarone for chemical cardioversion  TSH      Gout- (present on admission)   Assessment & Plan    History of     Diabetes mellitus type 2 in nonobese (HCC)- (present on admission)   Assessment & Plan    Sliding scale goal < 200 in post operative neursurgery         Discussed patient condition and risk of morbidity and/or mortality with RN and Patient.    The patient remains critically ill post neurosurgery with tight SBP goal and nicardipine infusion.  Critical care time = 35 minutes in directly providing and coordinating critical care and extensive data review.  No time overlap and excludes procedures.

## 2019-02-22 NOTE — PROGRESS NOTES
Pt discharged home with wife. Pt has discharge instructions and prescriptions to be filled. Pt escorted to parking garage via wheelchair and RN.

## 2019-02-22 NOTE — DISCHARGE INSTRUCTIONS
Discharge Instructions      NO STENUOUS ACTIVTY  NO ASPIRIN PRODUCTS  NO ANTI INFLAMMATORY PRODUCTS - NSAIDS-MOTRIN  NO SOAKING R GROINT PUNCTURE SITE AREA UNTIL FULLY HEALED - NO SOAKING IN POOL, BATH, HOT TUBS  FOLLOW UP WITH DR VITALE - APPT SCHEDULED April 3, 2019 AT 10 AM    PRESCRIPTION TO BE FILLED- CALLED IN TO hospitals PHARMACY    Discharged to home by car with relative. Discharged via wheelchair, hospital escort: Yes.  Special equipment needed: Not Applicable    Be sure to schedule a follow-up appointment with your primary care doctor or any specialists as instructed.     Discharge Plan:   Diet Plan: Discussed (as tolerated)  Activity Level: Discussed (NO STRENOUS ACTIVITY)  Confirmed Follow up Appointment: Appointment Scheduled (DR Vitale  April 3, 2019 at 10 am     and Dr Bender  March 7, 2019 at 12 pm)  Confirmed Symptoms Management: Discussed (check R groin site )  Medication Reconciliation Updated: Yes  Pneumococcal Vaccine Administered/Refused: Given (See MAR)  Influenza Vaccine Indication:  (HX Omar [place])    I understand that a diet low in cholesterol, fat, and sodium is recommended for good health. Unless I have been given specific instructions below for another diet, I accept this instruction as my diet prescription.   Other diet: as tolerated      Special Instructions:     · Is patient discharged on Warfarin / Coumadin?   No     Depression / Suicide Risk    As you are discharged from this [facility] facility, it is important to learn how to keep safe from harming yourself.    Recognize the warning signs:  · Abrupt changes in personality, positive or negative- including increase in energy   · Giving away possessions  · Change in eating patterns- significant weight changes-  positive or negative  · Change in sleeping patterns- unable to sleep or sleeping all the time   · Unwillingness or inability to communicate  · Depression  · Unusual sadness, discouragement and  loneliness  · Talk of wanting to die  · Neglect of personal appearance   · Rebelliousness- reckless behavior  · Withdrawal from people/activities they love  · Confusion- inability to concentrate     If you or a loved one observes any of these behaviors or has concerns about self-harm, here's what you can do:  · Talk about it- your feelings and reasons for harming yourself  · Remove any means that you might use to hurt yourself (examples: pills, rope, extension cords, firearm)  · Get professional help from the community (Mental Health, Substance Abuse, psychological counseling)  · Do not be alone:Call your Safe Contact- someone whom you trust who will be there for you.  · Call your local CRISIS HOTLINE 204-8309 or 445-287-6831  · Call your local Children's Mobile Crisis Response Team [place] (355) 686-5610 or www.[employer]  · Call the toll free National Suicide Prevention Hotlines   · National Suicide Prevention Lifeline 456-064-UYTC (6281)  · National Hope Line Network 800-SUICIDE (731-5211)      Brain Arteriovenous Malformation, Adult  A brain arteriovenous malformation (AVM) is a condition that means your arteries and veins are tangled. The veins bring blood to the heart and lungs. The arteries bring blood away from the heart and to the brain. If they are tangled, blood cannot travel to where it is needed. Brain AVM may also lead to bleeding in the brain (hemorrhage), which can be life-threatening. Most brain AVMs are present since birth (congenital).  What are the causes?  It is not known what causes brain AVM. Genes that are passed down through families may cause some types of AVM.  What are the signs or symptoms?  Symptoms of this condition depend on which area of the brain is affected and the amount of damage. Symptoms may include:  · Headache.  · Dizziness.  · Vision changes.  · Seizure.  · Weakness or loss of movement in part of the body.  · Tingling or numbness in part of the body.  · Loss of  ability to speak.  · Clumsiness.  · Confusion or memory loss.  · Seeing things that are not there (hallucinations).  · Fainting, if the AVM ruptures.  You may not have any symptoms.  How is this diagnosed?  Your health care provider may suspect a brain AVM based on your symptoms and medical history. You will have a physical exam and will also have tests done, which may include:  · MRI or magnetic resonance angiogram (MRA).  · CT scan or CT angiogram (CTA).  · Cerebral angiogram.  · Electroencephalogram (EEG) if your health care provider thinks that you have had a seizure.  How is this treated?  Treatment will depend on the size, location, and severity of the brain AVM. Treatment may include:  · Surgery to remove the AVM (craniotomy).  · Embolization. This involves closing off the vessels of the AVM by injecting glue into them.  · Radiosurgery. This involves focusing radiation on the AVM.  · Medicines to control your symptoms, such as seizures or headaches.  · Monitoring the AVM with imaging tests to make sure it is not growing.  Follow these instructions at home:  · Learn as much as you can about your condition and work closely with your team of health care providers.  · Take over-the-counter and prescription medicines only as told by your health care provider. Do not take blood thinners, such as aspirin, ibuprofen, NSAIDs, or warfarin, unless your health care provider tells you to do that.  · Keep all follow-up visits as told by your health care provider. This is important.  Get help right away if:  · You have a sudden, severe headache with no known cause.  · You have nausea or vomiting occurring with another symptom.  · You have sudden weakness or numbness of your face, arm, or leg, especially on one side of your body.  · You have sudden trouble walking or difficulty moving your arms or legs.  · You have sudden confusion.  · You have sudden trouble speaking, understanding, or both (aphasia).  · You have sudden  trouble seeing in one or both of your eyes.  · You have a sudden loss of balance or coordination.  · You have a stiff neck.  · You have difficulty breathing.  · You have partial or total loss of consciousness.  These symptoms may represent a serious problem that is an emergency. Do not wait to see if the symptoms will go away. Get medical help right away. Call your local emergency services (911 in the [place] Do not drive yourself to the hospital.   This information is not intended to replace advice given to you by your health care provider. Make sure you discuss any questions you have with your health care provider.  Document Released: 12/08/2003 Document Revised: 07/07/2017 Document Reviewed: 04/28/2017  [employer] Interactive Patient Education © 2017 [employer]    Embolization  Embolization is a procedure to block one or more of your blood vessels. This procedure may be done to stop bleeding inside your body or to cut off the blood supply to an abnormal growth of blood vessels or a tumor. Embolization may also be used to treat blood vessels that have become weak and are leaking or in danger of rupturing (aneurysm). A type of medication or synthetic material (embolic agents) that is injected into an artery or vein through a long plastic tube (catheter) is used to stop the flow of blood.   LET YOUR HEALTH CARE PROVIDER KNOW ABOUT  · Any allergies you have.  · All medicines you are taking, including vitamins, herbs, eye drops, creams, and over-the-counter medicines.  · Previous problems you or members of your family have had with the use of anesthetics.  · Any blood disorders you have.  · Previous surgeries you have had.  · Medical conditions you have, especially diabetes or kidney problems.  RISKS AND COMPLICATIONS   Generally, this is a safe procedure. However, as with any procedure, problems can occur. Possible problems include:  · Allergic reaction.  · Infection.  · Swelling, bleeding, or bruising at the  puncture site.  · Blood clots.  · Damage to the blood vessel.  · Kidney damage.  BEFORE THE PROCEDURE  Before the procedure, you may have blood tests to make sure your kidneys and liver are working well. These tests can also check to see if your blood is clotting like it should. If you take medicine to keep your blood from clotting (anticoagulants), ask your health care provider when you should stop taking them.    You may need to stop eating and drinking for 6-8 hours before the procedure. Ask your health care provider.  Make arrangements for someone to take you home. Depending on the procedure, you may be able to go home the same day. However, most people stay overnight in the hospital after this procedure. Ask your health care provider what to expect.  PROCEDURE  Embolization usually takes about 90 minutes. The procedure may vary depending on which part of your body is being treated, but usually the following things will be done:  · You may be given medicine that makes you go to sleep (general anesthetic) or a medicine that numbs only a particular area (local anesthetic). If you are given a local anesthetic, you may also be given medicine to make you relaxed and sleepy (sedative).  · An IV tube will be inserted into your body. Medicine will flow through this tube.  · A needle will be inserted into one of the large blood vessels in your groin (femoral blood vessel).  · A catheter will be inserted into the needle and guided to the area that needs to be treated.  · A dye will be injected through the IV tube and X-rays will be taken. This helps to show the exact location of the blood vessels that are causing the problem.  · The embolic agent will then be injected into the blood vessel.  · More X-rays will be taken to make sure the blood vessel has been closed.  · The catheter will be removed and pressure will be applied to the incision to stop any bleeding.  · A bandage (dressing) will be applied.  AFTER THE  PROCEDURE  · You will stay in a recovery area until the anesthesia has worn off. Your blood pressure and pulse will be checked.  · You may also get medication to make you feel comfortable if you have pain, headache, or nausea after the procedure.  · You will need to remain lying down for 6-8 hours. This may mean you have to stay overnight in the hospital. People usually can leave the hospital within 24 hours after the procedure.  This information is not intended to replace advice given to you by your health care provider. Make sure you discuss any questions you have with your health care provider.  Document Released: 12/23/2014 Document Reviewed: 12/23/2014  [employer] Interactive Patient Education © 2017 [employer]    Embolization, Care After  Refer to this sheet in the next few weeks. These instructions provide you with information on caring for yourself after your procedure. Your health care provider may also give you more specific instructions. Your treatment has been planned according to current medical practices, but problems sometimes occur. Call your health care provider if you have any problems or questions after your procedure.  WHAT TO EXPECT AFTER THE PROCEDURE  After your procedure, it is typical to have the following:   · Pain, swelling, or bruising at the puncture site.  · Headache.  · Loss of appetite, nausea, or vomiting.  · Fever.  HOME CARE INSTRUCTIONS   · Take medicine only as directed by your health care provider.  · You can eat what you usually do, but be sure to include lots of fruits, vegetables, and whole grains in your diet. This helps prevent constipation.  · Drink enough fluids to keep your urine clear or pale yellow.  · Take two 10-minute walks each day.  · Do not lift anything heavier than 10 pounds for 1 week after the procedure.  · Do not drive for 24 hours after the procedure.  · Do not take a bath for 5 days after the procedure. It is okay to take a shower.  · You may be able to  get back to all your normal activities within 1 week after your procedure. Ask your health care provider when you can return to sexual activity.  · Keep all your follow-up appointments.  SEEK MEDICAL CARE IF:  · Your pain medicine is not helping.  · You have a fever.  · You have nausea or vomiting.  · You have new bruising or swelling in your groin.  SEEK IMMEDIATE MEDICAL CARE IF:  · You have a fever or persistent symptoms for longer than 3 days.  · You have pain in your legs.  · You develop swelling and discoloration in your legs.  · Your legs become pale and cold or blue.  · You develop shortness of breath, feel faint, or pass out.  · You have chest pain or trouble breathing.  · You develop a cough or cough up blood.  · You develop a rash.  · You have side effects from your medicine.  · You feel weak or have trouble moving your arms or legs.  · You have balance problems.  · You have speech or vision problems.     This information is not intended to replace advice given to you by your health care provider. Make sure you discuss any questions you have with your health care provider.     Document Released: 12/23/2014 Document Reviewed: 12/23/2014  [employer] Interactive Patient Education ©2016 [employer]

## 2019-02-22 NOTE — PROGRESS NOTES
Pt ambulated in Hallway around entire ICU units with Rn and tolerated well. Pt steady with No complaints of headache. Pt voided 250 ml yellow urine post roman dc'd.

## 2019-04-04 ENCOUNTER — HOSPITAL ENCOUNTER (OUTPATIENT)
Dept: LAB | Facility: MEDICAL CENTER | Age: 79
End: 2019-04-04
Attending: UROLOGY
Payer: MEDICARE

## 2019-04-04 PROCEDURE — 84153 ASSAY OF PSA TOTAL: CPT | Mod: GZ

## 2019-04-05 LAB — PSA SERPL-MCNC: 4.17 NG/ML (ref 0–4)

## 2021-01-14 DIAGNOSIS — Z23 NEED FOR VACCINATION: ICD-10-CM

## 2022-09-06 ENCOUNTER — APPOINTMENT (RX ONLY)
Dept: URBAN - METROPOLITAN AREA CLINIC 4 | Facility: CLINIC | Age: 82
Setting detail: DERMATOLOGY
End: 2022-09-06

## 2022-09-06 DIAGNOSIS — L81.4 OTHER MELANIN HYPERPIGMENTATION: ICD-10-CM

## 2022-09-06 DIAGNOSIS — L80 VITILIGO: ICD-10-CM

## 2022-09-06 DIAGNOSIS — L57.0 ACTINIC KERATOSIS: ICD-10-CM

## 2022-09-06 DIAGNOSIS — L82.1 OTHER SEBORRHEIC KERATOSIS: ICD-10-CM

## 2022-09-06 DIAGNOSIS — D18.0 HEMANGIOMA: ICD-10-CM

## 2022-09-06 DIAGNOSIS — L82.0 INFLAMED SEBORRHEIC KERATOSIS: ICD-10-CM

## 2022-09-06 PROBLEM — D18.01 HEMANGIOMA OF SKIN AND SUBCUTANEOUS TISSUE: Status: ACTIVE | Noted: 2022-09-06

## 2022-09-06 PROCEDURE — 17000 DESTRUCT PREMALG LESION: CPT | Mod: 59

## 2022-09-06 PROCEDURE — 99203 OFFICE O/P NEW LOW 30 MIN: CPT | Mod: 25

## 2022-09-06 PROCEDURE — ? LIQUID NITROGEN

## 2022-09-06 PROCEDURE — ? ADDITIONAL NOTES

## 2022-09-06 PROCEDURE — 17110 DESTRUCTION B9 LES UP TO 14: CPT

## 2022-09-06 PROCEDURE — ? COUNSELING

## 2022-09-06 ASSESSMENT — LOCATION SIMPLE DESCRIPTION DERM
LOCATION SIMPLE: LEFT UPPER ARM
LOCATION SIMPLE: LEFT UPPER BACK
LOCATION SIMPLE: LEFT HAND
LOCATION SIMPLE: ABDOMEN
LOCATION SIMPLE: LEFT WRIST
LOCATION SIMPLE: RIGHT HAND
LOCATION SIMPLE: RIGHT FOREARM
LOCATION SIMPLE: RIGHT EAR

## 2022-09-06 ASSESSMENT — LOCATION DETAILED DESCRIPTION DERM
LOCATION DETAILED: LEFT DORSAL MIDDLE METACARPOPHALANGEAL JOINT
LOCATION DETAILED: RIGHT PROXIMAL DORSAL FOREARM
LOCATION DETAILED: LEFT MID-UPPER BACK
LOCATION DETAILED: RIGHT INFERIOR HELIX
LOCATION DETAILED: EPIGASTRIC SKIN
LOCATION DETAILED: RIGHT RADIAL DORSAL HAND
LOCATION DETAILED: LEFT ANTERIOR DISTAL UPPER ARM
LOCATION DETAILED: LEFT DORSAL WRIST

## 2022-09-06 ASSESSMENT — LOCATION ZONE DERM
LOCATION ZONE: ARM
LOCATION ZONE: TRUNK
LOCATION ZONE: EAR
LOCATION ZONE: HAND

## 2022-09-06 NOTE — PROCEDURE: MIPS QUALITY
Quality 111:Pneumonia Vaccination Status For Older Adults: Pneumococcal vaccine administered on or after patient’s 60th birthday and before the end of the measurement period
Detail Level: Detailed
Quality 130: Documentation Of Current Medications In The Medical Record: Current Medications Documented
Quality 431: Preventive Care And Screening: Unhealthy Alcohol Use - Screening: Patient not identified as an unhealthy alcohol user when screened for unhealthy alcohol use using a systematic screening method
Quality 110: Preventive Care And Screening: Influenza Immunization: Influenza Immunization Administered during Influenza season
Quality 226: Preventive Care And Screening: Tobacco Use: Screening And Cessation Intervention: Patient screened for tobacco use, is a smoker AND received Cessation Counseling within the Previous 12 Months

## 2022-09-06 NOTE — PROCEDURE: ADDITIONAL NOTES
Detail Level: Detailed
Additional Notes: Hands only, patient states he was a  for many years.
Render Risk Assessment In Note?: no

## 2022-09-06 NOTE — PROCEDURE: LIQUID NITROGEN
Spray Paint Text: The liquid nitrogen was applied to the skin utilizing a spray paint frosting technique.
Show Applicator Variable?: Yes
Post-Care Instructions: I reviewed with the patient in detail post-care instructions. Patient is to wear sunprotection, and avoid picking at any of the treated lesions. Pt may apply Vaseline to crusted or scabbing areas.
Consent: The patient's consent was obtained including but not limited to risks of crusting, scabbing, blistering, scarring, darker or lighter pigmentary change, recurrence, incomplete removal and infection.
Add 52 Modifier (Optional): no
Medical Necessity Information: It is in your best interest to select a reason for this procedure from the list below. All of these items fulfill various CMS LCD requirements except the new and changing color options.
Medical Necessity Clause: This procedure was medically necessary because the lesions that were treated were:
Detail Level: Detailed
Duration Of Freeze Thaw-Cycle (Seconds): 3
Number Of Freeze-Thaw Cycles: 1 freeze-thaw cycle
Aperture Size (Optional): A